# Patient Record
Sex: MALE | Race: WHITE | NOT HISPANIC OR LATINO | Employment: FULL TIME | URBAN - METROPOLITAN AREA
[De-identification: names, ages, dates, MRNs, and addresses within clinical notes are randomized per-mention and may not be internally consistent; named-entity substitution may affect disease eponyms.]

---

## 2022-09-08 ENCOUNTER — OFFICE VISIT (OUTPATIENT)
Dept: FAMILY MEDICINE CLINIC | Facility: CLINIC | Age: 42
End: 2022-09-08
Payer: COMMERCIAL

## 2022-09-08 VITALS
DIASTOLIC BLOOD PRESSURE: 90 MMHG | HEIGHT: 67 IN | BODY MASS INDEX: 24.96 KG/M2 | RESPIRATION RATE: 16 BRPM | WEIGHT: 159 LBS | TEMPERATURE: 97.9 F | OXYGEN SATURATION: 99 % | HEART RATE: 86 BPM | SYSTOLIC BLOOD PRESSURE: 128 MMHG

## 2022-09-08 DIAGNOSIS — R53.83 FATIGUE, UNSPECIFIED TYPE: ICD-10-CM

## 2022-09-08 DIAGNOSIS — Z86.16 PERSONAL HISTORY OF COVID-19: ICD-10-CM

## 2022-09-08 DIAGNOSIS — Z00.00 ENCOUNTER FOR ANNUAL PHYSICAL EXAM: Primary | ICD-10-CM

## 2022-09-08 DIAGNOSIS — Z23 ENCOUNTER FOR IMMUNIZATION: ICD-10-CM

## 2022-09-08 DIAGNOSIS — Z13.220 SCREENING FOR LIPID DISORDERS: ICD-10-CM

## 2022-09-08 PROCEDURE — 90715 TDAP VACCINE 7 YRS/> IM: CPT

## 2022-09-08 PROCEDURE — 90471 IMMUNIZATION ADMIN: CPT

## 2022-09-08 PROCEDURE — 3725F SCREEN DEPRESSION PERFORMED: CPT | Performed by: FAMILY MEDICINE

## 2022-09-08 PROCEDURE — 99386 PREV VISIT NEW AGE 40-64: CPT | Performed by: FAMILY MEDICINE

## 2022-09-08 NOTE — PATIENT INSTRUCTIONS
Wellness Visit for Adults   AMBULATORY CARE:   A wellness visit  is when you see your healthcare provider to get screened for health problems  Your healthcare provider will also give you advice on how to stay healthy  Write down your questions so you remember to ask them  Ask your healthcare provider how often you should have a wellness visit  What happens at a wellness visit:  Your healthcare provider will ask about your health, and your family history of health problems  This includes high blood pressure, heart disease, and cancer  He or she will ask if you have symptoms that concern you, if you smoke, and about your mood  You may also be asked about your intake of medicines, supplements, food, and alcohol  Any of the following may be done: Your weight  will be checked  Your height may also be checked so your body mass index (BMI) can be calculated  Your BMI shows if you are at a healthy weight  Your blood pressure  and heart rate will be checked  Your temperature may also be checked  Blood and urine tests  may be done  Blood tests may be done to check your cholesterol levels  Abnormal cholesterol levels increase your risk for heart disease and stroke  You may also need a blood or urine test to check for diabetes if you are at increased risk  Urine tests may be done to look for signs of an infection or kidney disease  A physical exam  includes checking your heartbeat and lungs with a stethoscope  Your healthcare provider may also check your skin to look for sun damage  Screening tests  may be recommended  A screening test is done to check for diseases that may not cause symptoms  The screening tests you may need depend on your age, gender, family history, and lifestyle habits  For example, colorectal screening may be recommended if you are 48years old or older  Screening tests you need if you are a woman:   A Pap smear  is used to screen for cervical cancer   Pap smears are usually done every 3 to 5 years depending on your age  You may need them more often if you have had abnormal Pap smear test results in the past  Ask your healthcare provider how often you should have a Pap smear  A mammogram  is an x-ray of your breasts to screen for breast cancer  Experts recommend mammograms every 2 years starting at age 48 years  You may need a mammogram at age 52 years or younger if you have an increased risk for breast cancer  Talk to your healthcare provider about when you should start having mammograms and how often you need them  Vaccines you may need:   Get an influenza vaccine  every year  The influenza vaccine protects you from the flu  Several types of viruses cause the flu  The viruses change over time, so new vaccines are made each year  Get a tetanus-diphtheria (Td) booster vaccine  every 10 years  This vaccine protects you against tetanus and diphtheria  Tetanus is a severe infection that may cause painful muscle spasms and lockjaw  Diphtheria is a severe bacterial infection that causes a thick covering in the back of your mouth and throat  Get a human papillomavirus (HPV) vaccine  if you are female and aged 23 to 32 or male 23 to 24 and never received it  This vaccine protects you from HPV infection  HPV is the most common infection spread by sexual contact  HPV may also cause vaginal, penile, and anal cancers  Get a pneumococcal vaccine  if you are aged 72 years or older  The pneumococcal vaccine is an injection given to protect you from pneumococcal disease  Pneumococcal disease is an infection caused by pneumococcal bacteria  The infection may cause pneumonia, meningitis, or an ear infection  Get a shingles vaccine  if you are 60 or older, even if you have had shingles before  The shingles vaccine is an injection to protect you from the varicella-zoster virus  This is the same virus that causes chickenpox   Shingles is a painful rash that develops in people who had chickenpox or have been exposed to the virus  How to eat healthy:  My Plate is a model for planning healthy meals  It shows the types and amounts of foods that should go on your plate  Fruits and vegetables make up about half of your plate, and grains and protein make up the other half  A serving of dairy is included on the side of your plate  The amount of calories and serving sizes you need depends on your age, gender, weight, and height  Examples of healthy foods are listed below:  Eat a variety of vegetables  such as dark green, red, and orange vegetables  You can also include canned vegetables low in sodium (salt) and frozen vegetables without added butter or sauces  Eat a variety of fresh fruits , canned fruit in 100% juice, frozen fruit, and dried fruit  Include whole grains  At least half of the grains you eat should be whole grains  Examples include whole-wheat bread, wheat pasta, brown rice, and whole-grain cereals such as oatmeal     Eat a variety of protein foods such as seafood (fish and shellfish), lean meat, and poultry without skin (turkey and chicken)  Examples of lean meats include pork leg, shoulder, or tenderloin, and beef round, sirloin, tenderloin, and extra lean ground beef  Other protein foods include eggs and egg substitutes, beans, peas, soy products, nuts, and seeds  Choose low-fat dairy products such as skim or 1% milk or low-fat yogurt, cheese, and cottage cheese  Limit unhealthy fats  such as butter, hard margarine, and shortening  Exercise:  Exercise at least 30 minutes per day on most days of the week  Some examples of exercise include walking, biking, dancing, and swimming  You can also fit in more physical activity by taking the stairs instead of the elevator or parking farther away from stores  Include muscle strengthening activities 2 days each week  Regular exercise provides many health benefits   It helps you manage your weight, and decreases your risk for type 2 diabetes, heart disease, stroke, and high blood pressure  Exercise can also help improve your mood  Ask your healthcare provider about the best exercise plan for you  General health and safety guidelines:   Do not smoke  Nicotine and other chemicals in cigarettes and cigars can cause lung damage  Ask your healthcare provider for information if you currently smoke and need help to quit  E-cigarettes or smokeless tobacco still contain nicotine  Talk to your healthcare provider before you use these products  Limit alcohol  A drink of alcohol is 12 ounces of beer, 5 ounces of wine, or 1½ ounces of liquor  Lose weight, if needed  Being overweight increases your risk of certain health conditions  These include heart disease, high blood pressure, type 2 diabetes, and certain types of cancer  Protect your skin  Do not sunbathe or use tanning beds  Use sunscreen with a SPF 15 or higher  Apply sunscreen at least 15 minutes before you go outside  Reapply sunscreen every 2 hours  Wear protective clothing, hats, and sunglasses when you are outside  Drive safely  Always wear your seatbelt  Make sure everyone in your car wears a seatbelt  A seatbelt can save your life if you are in an accident  Do not use your cell phone when you are driving  This could distract you and cause an accident  Pull over if you need to make a call or send a text message  Practice safe sex  Use latex condoms if are sexually active and have more than one partner  Your healthcare provider may recommend screening tests for sexually transmitted infections (STIs)  Wear helmets, lifejackets, and protective gear  Always wear a helmet when you ride a bike or motorcycle, go skiing, or play sports that could cause a head injury  Wear protective equipment when you play sports  Wear a lifejacket when you are on a boat or doing water sports      © Copyright [employer] 2022 Information is for End User's use only and may not be sold, redistributed or otherwise used for commercial purposes  All illustrations and images included in CareNotes® are the copyrighted property of A D [employer] M [employer] Inc  or [employer]  The above information is an  only  It is not intended as medical advice for individual conditions or treatments  Talk to your doctor, nurse or pharmacist before following any medical regimen to see if it is safe and effective for you

## 2022-09-08 NOTE — PROGRESS NOTES
[address]    NAME: Didi Caruso  AGE: 38 y o  SEX: male  : 1980     DATE: 2022     Assessment and Plan:     Here as a new patient with fatigue  Had Mayte in May  Suspect post covid fatigue  May also have CHRISTAL with a stop bang of 3  Will order labs  Tdap administered today  Will need colon cancer screen next month  Recommend increased activity  Follow up in 6 months or sooner as needed      Problem List Items Addressed This Visit    None     Visit Diagnoses     Encounter for annual physical exam    -  Primary    Encounter for immunization        Relevant Orders    TDAP VACCINE GREATER THAN OR EQUAL TO 8YO IM (Completed)    Personal history of COVID-19        Fatigue, unspecified type        Relevant Orders    CBC and differential    TSH, 3rd generation with Free T4 reflex    Vitamin D 25 hydroxy    BMI 25 0-25 9,adult        Relevant Orders    Comprehensive metabolic panel    Screening for lipid disorders        Relevant Orders    Lipid panel          Immunizations and preventive care screenings were discussed with patient today  Appropriate education was printed on patient's after visit summary  Counseling:  · Exercise: the importance of regular exercise/physical activity was discussed  Recommend exercise 3-5 times per week for at least 30 minutes  BMI Counseling: Body mass index is 25 28 kg/m²  The BMI is above normal  Nutrition recommendations include encouraging healthy choices of fruits and vegetables, decreasing fast food intake, limiting drinks that contain sugar, moderation in carbohydrate intake, increasing intake of lean protein, reducing intake of saturated and trans fat and reducing intake of cholesterol  Exercise recommendations include moderate physical activity 150 minutes/week  No pharmacotherapy was ordered  Rationale for BMI follow-up plan is due to patient being overweight or obese       Depression Screening and Follow-up Plan: Patient was screened for depression during today's encounter  They screened negative with a PHQ-2 score of 1  No follow-ups on file  Chief Complaint:     Chief Complaint   Patient presents with    Annual Exam     Feels very fatigued after covid in May      History of Present Illness:     Adult Annual Physical   Patient here as a new patient for a comprehensive physical exam   Had Mayte in May and has felt tired since   Increase daytime somnolence  Having difficulty sleeping at night  Sleep is interrupted  Before COVID, he slept throughout the night  Decreased exercise endurance  Prior to Mayte he was active and would hike regularly   No melena   Has had witnessed apneic episodes  Snores at night  Has not been evaluated for sleep apnea  Had blood work done 6 years ago  Testerone > 500   Told he may have beta thalassemia minor  Also reports intermittent substernal pain that last 1-2 minutes and started 2-3 years ago  Attributes it to indigestion and is relieved with belching  3 on Stop bang    Diet and Physical Activity  · Diet/Nutrition: well balanced diet  · Exercise: Previously active  Used to hike and played basketball  Depression Screening  PHQ-2/9 Depression Screening    Little interest or pleasure in doing things: 1 - several days  Feeling down, depressed, or hopeless: 0 - not at all  PHQ-2 Score: 1  PHQ-2 Interpretation: Negative depression screen       General Health  · Sleep: sleeps poorly, unrefreshing sleep and witnessed apnea  · Hearing: normal - bilateral   · Vision: previous LASIK surgery 1 year ago   · Dental: regular dental visits and was last seen 2 months ago    Health  · Symptoms include: urinary frequency and difficulty with sustaining an erection  This comes and go  Review of Systems:     Review of Systems   Constitutional: Positive for fatigue  Negative for appetite change, chills and fever     Respiratory: Positive for shortness of breath (with exertion )  Negative for chest tightness and wheezing  Cardiovascular: Negative for chest pain, palpitations and leg swelling  Gastrointestinal: Negative for abdominal pain, diarrhea, nausea and vomiting  Musculoskeletal: Negative for myalgias  Neurological: Negative for dizziness, weakness, light-headedness and headaches  Psychiatric/Behavioral: Positive for sleep disturbance  Past Medical History:     Past Medical History:   Diagnosis Date    Chalazion of right upper eyelid     COVID-19 05/2022      Past Surgical History:     Past Surgical History:   Procedure Laterality Date    HERNIA REPAIR      LASIK        Family History:     Family History   Adopted: Yes   Family history unknown: Yes      Social History:     Social History     Socioeconomic History    Marital status: Single     Spouse name: None    Number of children: None    Years of education: None    Highest education level: None   Occupational History    None   Tobacco Use    Smoking status: Never Smoker    Smokeless tobacco: Never Used   Vaping Use    Vaping Use: Never used   Substance and Sexual Activity    Alcohol use: Yes    Drug use: Not Currently     Types: Marijuana    Sexual activity: Yes     Partners: Female   Other Topics Concern    None   Social History Narrative    None     Social Determinants of Health     Financial Resource Strain: Not on file   Food Insecurity: Not on file   Transportation Needs: Not on file   Physical Activity: Not on file   Stress: Not on file   Social Connections: Not on file   Intimate Partner Violence: Not on file   Housing Stability: Not on file      Current Medications:     No current outpatient medications on file  No current facility-administered medications for this visit        Allergies:     No Known Allergies   Physical Exam:     /90 (BP Location: Left arm, Patient Position: Sitting, Cuff Size: Standard)   Pulse 86   Temp 97 9 °F (36 6 °C) (Tympanic)   Resp 16   Ht 5' 6 5" (1 689 m)   Wt 72 1 kg (159 lb)   SpO2 99%   BMI 25 28 kg/m²     Physical Exam  Vitals reviewed  Constitutional:       General: He is not in acute distress  Appearance: Normal appearance  HENT:      Head: Normocephalic and atraumatic  Right Ear: Tympanic membrane normal       Left Ear: Tympanic membrane normal       Mouth/Throat:      Mouth: Mucous membranes are moist       Pharynx: No oropharyngeal exudate or posterior oropharyngeal erythema  Eyes:      Extraocular Movements: Extraocular movements intact  Cardiovascular:      Rate and Rhythm: Normal rate and regular rhythm  Heart sounds: No murmur heard  Pulmonary:      Effort: Pulmonary effort is normal  No respiratory distress  Breath sounds: No stridor  No wheezing, rhonchi or rales  Abdominal:      General: Abdomen is flat  There is no distension  Palpations: Abdomen is soft  There is no mass  Tenderness: There is no abdominal tenderness  There is no guarding or rebound  Hernia: No hernia is present  Musculoskeletal:      Right lower leg: No edema  Left lower leg: No edema  Skin:     General: Skin is warm  Neurological:      Mental Status: He is alert and oriented to person, place, and time  Psychiatric:         Mood and Affect: Mood normal          Behavior: Behavior normal          Thought Content:  Thought content normal          Judgment: Judgment normal           Reema Gunn MD  [address] [facility]

## 2022-09-10 ENCOUNTER — OFFICE VISIT (OUTPATIENT)
Dept: URGENT CARE | Age: 42
End: 2022-09-10
Payer: COMMERCIAL

## 2022-09-10 VITALS
HEIGHT: 66 IN | HEART RATE: 88 BPM | OXYGEN SATURATION: 99 % | RESPIRATION RATE: 18 BRPM | TEMPERATURE: 97.8 F | WEIGHT: 160 LBS | DIASTOLIC BLOOD PRESSURE: 86 MMHG | SYSTOLIC BLOOD PRESSURE: 144 MMHG | BODY MASS INDEX: 25.71 KG/M2

## 2022-09-10 DIAGNOSIS — R68.84 JAW PAIN: Primary | ICD-10-CM

## 2022-09-10 DIAGNOSIS — R07.81 RIB PAIN: ICD-10-CM

## 2022-09-10 PROCEDURE — 99213 OFFICE O/P EST LOW 20 MIN: CPT

## 2022-09-10 NOTE — PROGRESS NOTES
[address]        NAME: Bryant Trujillo is a 38 y o  male  : 1980    MRN: [id number]  DATE: 2022  TIME: 3:27 PM    Assessment and Plan   Jaw pain [R68 [id number]]  1  Jaw pain     2  Rib pain           Patient Instructions     Complains of jaw pain with chest pain/rib pain and back pain  Recommend ER evaluation  Declined ambulance  Chief Complaint     Chief Complaint   Patient presents with    Jaw Pain     Episodes of Jaw Pain, Rib Pain, Back Pain began today, Patient denies any pain currently         History of Present Illness     38 y o  M presents with complaints of bilateral jaw pain, rib pain and back pain starting today  Denies trauma or injury  Denies recent illness  Denies CP or SOB  No fevers or chills  Denies strenuous activity  Denies hx of PE/DVT  No significant PMH  Denies numbness or tingling  Review of Systems   Review of Systems   Constitutional: Negative for chills, fatigue and fever  HENT: Negative for congestion, ear discharge, ear pain, facial swelling, rhinorrhea, sinus pressure, sinus pain, sore throat and trouble swallowing  Jaw pain   Eyes: Negative for photophobia, pain and visual disturbance  Respiratory: Negative for cough, chest tightness, shortness of breath and wheezing  Cardiovascular: Positive for chest pain (rib pain)  Negative for palpitations and leg swelling  Gastrointestinal: Negative for abdominal pain, diarrhea, nausea and vomiting  Genitourinary: Negative for dysuria, flank pain and hematuria  Musculoskeletal: Positive for back pain  Negative for arthralgias, myalgias and neck pain  Skin: Negative for pallor and wound  Neurological: Negative for dizziness, syncope, weakness, numbness and headaches  Psychiatric/Behavioral: Negative for confusion and sleep disturbance  All other systems reviewed and are negative  Current Medications     No current outpatient medications on file      Current Allergies Allergies as of 09/10/2022    (No Known Allergies)            The following portions of the patient's history were reviewed and updated as appropriate: allergies, current medications, past family history, past medical history, past social history, past surgical history and problem list      Past Medical History:   Diagnosis Date    Chalazion of right upper eyelid     COVID-19 05/2022       Past Surgical History:   Procedure Laterality Date    HERNIA REPAIR      LASIK         Family History   Adopted: Yes   Family history unknown: Yes         Medications have been verified  Objective   /86   Pulse 88   Temp 97 8 °F (36 6 °C)   Resp 18   Ht 5' 6" (1 676 m)   Wt 72 6 kg (160 lb)   SpO2 99%   BMI 25 82 kg/m²   No LMP for male patient  Physical Exam     Physical Exam  Vitals reviewed  Constitutional:       General: He is not in acute distress  Appearance: He is normal weight  He is not ill-appearing or toxic-appearing  HENT:      Head: Normocephalic  Right Ear: Tympanic membrane normal  No middle ear effusion  Tympanic membrane is not erythematous or bulging  Left Ear: Tympanic membrane normal   No middle ear effusion  Tympanic membrane is not erythematous or bulging  Nose: Nose normal       Right Sinus: No maxillary sinus tenderness or frontal sinus tenderness  Left Sinus: No maxillary sinus tenderness or frontal sinus tenderness  Mouth/Throat:      Mouth: Mucous membranes are moist       Pharynx: Uvula midline  No oropharyngeal exudate, posterior oropharyngeal erythema or uvula swelling  Tonsils: No tonsillar exudate or tonsillar abscesses  Eyes:      Extraocular Movements: Extraocular movements intact  Conjunctiva/sclera: Conjunctivae normal       Pupils: Pupils are equal, round, and reactive to light  Cardiovascular:      Rate and Rhythm: Normal rate and regular rhythm  Pulses: Normal pulses  Heart sounds: Normal heart sounds  Pulmonary:      Effort: Pulmonary effort is normal  No tachypnea or respiratory distress  Breath sounds: Normal breath sounds and air entry  No decreased breath sounds, wheezing, rhonchi or rales  Abdominal:      General: Bowel sounds are normal       Palpations: Abdomen is soft  Tenderness: There is no abdominal tenderness  Musculoskeletal:         General: Normal range of motion  Cervical back: Normal range of motion and neck supple  Lymphadenopathy:      Cervical: No cervical adenopathy  Skin:     General: Skin is warm and dry  Capillary Refill: Capillary refill takes less than 2 seconds  Neurological:      General: No focal deficit present  Mental Status: He is alert  Cranial Nerves: Cranial nerves are intact  No cranial nerve deficit  Sensory: Sensation is intact  Motor: Motor function is intact  Coordination: Coordination is intact  Deep Tendon Reflexes: Reflexes are normal and symmetric

## 2022-09-12 ENCOUNTER — TELEPHONE (OUTPATIENT)
Dept: FAMILY MEDICINE CLINIC | Facility: CLINIC | Age: 42
End: 2022-09-12

## 2022-09-12 ENCOUNTER — HOSPITAL ENCOUNTER (EMERGENCY)
Facility: HOSPITAL | Age: 42
Discharge: HOME | End: 2022-09-12
Attending: EMERGENCY MEDICINE
Payer: COMMERCIAL

## 2022-09-12 VITALS
HEIGHT: 66 IN | TEMPERATURE: 98.3 F | BODY MASS INDEX: 25.86 KG/M2 | HEART RATE: 79 BPM | RESPIRATION RATE: 18 BRPM | OXYGEN SATURATION: 100 % | DIASTOLIC BLOOD PRESSURE: 72 MMHG | SYSTOLIC BLOOD PRESSURE: 136 MMHG | WEIGHT: 160.94 LBS

## 2022-09-12 DIAGNOSIS — J06.9 URI (UPPER RESPIRATORY INFECTION): ICD-10-CM

## 2022-09-12 DIAGNOSIS — R51.9 HEADACHE: Primary | ICD-10-CM

## 2022-09-12 LAB
ALBUMIN SERPL BCP-MCNC: 4.6 G/DL (ref 3.5–5)
ALP SERPL-CCNC: 52 U/L (ref 34–104)
ALT SERPL W P-5'-P-CCNC: 19 U/L (ref 7–52)
ANION GAP SERPL CALCULATED.3IONS-SCNC: 7 MMOL/L (ref 4–13)
AST SERPL W P-5'-P-CCNC: 20 U/L (ref 13–39)
BASOPHILS # BLD AUTO: 0.03 THOUSANDS/ΜL (ref 0–0.1)
BASOPHILS NFR BLD AUTO: 0 % (ref 0–1)
BILIRUB SERPL-MCNC: 1.14 MG/DL (ref 0.2–1)
BUN SERPL-MCNC: 20 MG/DL (ref 5–25)
CALCIUM SERPL-MCNC: 9.3 MG/DL (ref 8.4–10.2)
CARDIAC TROPONIN I PNL SERPL HS: 2 NG/L
CHLORIDE SERPL-SCNC: 104 MMOL/L (ref 96–108)
CO2 SERPL-SCNC: 27 MMOL/L (ref 21–32)
CREAT SERPL-MCNC: 1.28 MG/DL (ref 0.6–1.3)
EOSINOPHIL # BLD AUTO: 0.02 THOUSAND/ΜL (ref 0–0.61)
EOSINOPHIL NFR BLD AUTO: 0 % (ref 0–6)
ERYTHROCYTE [DISTWIDTH] IN BLOOD BY AUTOMATED COUNT: 18.1 % (ref 11.6–15.1)
FLUAV RNA RESP QL NAA+PROBE: NEGATIVE
FLUBV RNA RESP QL NAA+PROBE: NEGATIVE
GFR SERPL CREATININE-BSD FRML MDRD: 67 ML/MIN/1.73SQ M
GLUCOSE SERPL-MCNC: 90 MG/DL (ref 65–140)
HCT VFR BLD AUTO: 46.1 % (ref 36.5–49.3)
HGB BLD-MCNC: 14.3 G/DL (ref 12–17)
IMM GRANULOCYTES # BLD AUTO: 0.02 THOUSAND/UL (ref 0–0.2)
IMM GRANULOCYTES NFR BLD AUTO: 0 % (ref 0–2)
LIPASE SERPL-CCNC: 19 U/L (ref 11–82)
LYMPHOCYTES # BLD AUTO: 1.35 THOUSANDS/ΜL (ref 0.6–4.47)
LYMPHOCYTES NFR BLD AUTO: 16 % (ref 14–44)
MCH RBC QN AUTO: 19.3 PG (ref 26.8–34.3)
MCHC RBC AUTO-ENTMCNC: 31 G/DL (ref 31.4–37.4)
MCV RBC AUTO: 62 FL (ref 82–98)
MONOCYTES # BLD AUTO: 0.48 THOUSAND/ΜL (ref 0.17–1.22)
MONOCYTES NFR BLD AUTO: 6 % (ref 4–12)
NEUTROPHILS # BLD AUTO: 6.65 THOUSANDS/ΜL (ref 1.85–7.62)
NEUTS SEG NFR BLD AUTO: 78 % (ref 43–75)
NRBC BLD AUTO-RTO: 0 /100 WBCS
PLATELET # BLD AUTO: 203 THOUSANDS/UL (ref 149–390)
POTASSIUM SERPL-SCNC: 3.9 MMOL/L (ref 3.5–5.3)
PROT SERPL-MCNC: 7.2 G/DL (ref 6.4–8.4)
RBC # BLD AUTO: 7.42 MILLION/UL (ref 3.88–5.62)
RSV RNA RESP QL NAA+PROBE: NEGATIVE
SARS-COV-2 RNA RESP QL NAA+PROBE: NEGATIVE
SODIUM SERPL-SCNC: 138 MMOL/L (ref 135–147)
WBC # BLD AUTO: 8.55 THOUSAND/UL (ref 4.31–10.16)

## 2022-09-12 PROCEDURE — 36415 COLL VENOUS BLD VENIPUNCTURE: CPT | Performed by: EMERGENCY MEDICINE

## 2022-09-12 PROCEDURE — 85025 COMPLETE CBC W/AUTO DIFF WBC: CPT | Performed by: EMERGENCY MEDICINE

## 2022-09-12 PROCEDURE — 93005 ELECTROCARDIOGRAM TRACING: CPT

## 2022-09-12 PROCEDURE — 99284 EMERGENCY DEPT VISIT MOD MDM: CPT

## 2022-09-12 PROCEDURE — 83690 ASSAY OF LIPASE: CPT | Performed by: EMERGENCY MEDICINE

## 2022-09-12 PROCEDURE — 84484 ASSAY OF TROPONIN QUANT: CPT | Performed by: EMERGENCY MEDICINE

## 2022-09-12 PROCEDURE — 0241U HB NFCT DS VIR RESP RNA 4 TRGT: CPT | Performed by: EMERGENCY MEDICINE

## 2022-09-12 PROCEDURE — 96374 THER/PROPH/DIAG INJ IV PUSH: CPT

## 2022-09-12 PROCEDURE — 99285 EMERGENCY DEPT VISIT HI MDM: CPT | Performed by: EMERGENCY MEDICINE

## 2022-09-12 PROCEDURE — 80053 COMPREHEN METABOLIC PANEL: CPT | Performed by: EMERGENCY MEDICINE

## 2022-09-12 RX ORDER — ACETAMINOPHEN 325 MG/1
975 TABLET ORAL ONCE
Status: COMPLETED | OUTPATIENT
Start: 2022-09-12 | End: 2022-09-12

## 2022-09-12 RX ORDER — KETOROLAC TROMETHAMINE 30 MG/ML
15 INJECTION, SOLUTION INTRAMUSCULAR; INTRAVENOUS ONCE
Status: COMPLETED | OUTPATIENT
Start: 2022-09-12 | End: 2022-09-12

## 2022-09-12 RX ORDER — NAPROXEN 500 MG/1
500 TABLET ORAL 2 TIMES DAILY WITH MEALS
Qty: 10 TABLET | Refills: 0 | Status: SHIPPED | OUTPATIENT
Start: 2022-09-12 | End: 2022-09-17

## 2022-09-12 RX ADMIN — ACETAMINOPHEN 975 MG: 325 TABLET ORAL at 14:34

## 2022-09-12 RX ADMIN — KETOROLAC TROMETHAMINE 15 MG: 30 INJECTION, SOLUTION INTRAMUSCULAR at 14:34

## 2022-09-12 NOTE — Clinical Note
Mary Garcia was seen and treated in our emergency department on 9/12/2022  Diagnosis:     Ana Luisa Lynn  may return to work on return date  He may return on this date: 09/14/2022         If you have any questions or concerns, please don't hesitate to call        Joan Hoffman MD    ______________________________           _______________          _______________  [facility] Representative                              Date                                Time

## 2022-09-12 NOTE — ED PROVIDER NOTES
History  Chief Complaint   Patient presents with    Headache    Jaw Pain     Pt states he has had severe b/l head pain/pressure, radiating to jaw/neck, and b/l lung pain  Denies SOB  Advil 11am  Denies n/v/d  HPI     Patient is otherwise healthy 43-year-old male who presents emergency department for evaluation of gradual onset bilateral head pain  Had some lung pain over the weekend  Denies any shortness of breath  No nausea, vomiting, diarrhea  States he started off with head cold  His symptoms have been waxing and waning over the past several days  No tick exposure  He went to urgent care several days ago, was asked to go the emergency department if he feels bad  He called his doctor today and was told to go to the emergency department  He denies taking any medications  Denies any falls or trauma  Denies any thunderclap future headache  Denies any neck pain  Is unsure if he had a fever home because he does not have a thermometer  None       Past Medical History:   Diagnosis Date    Chalazion of right upper eyelid     COVID-19 05/2022       Past Surgical History:   Procedure Laterality Date    HERNIA REPAIR      LASIK         Family History   Adopted: Yes   Family history unknown: Yes     I have reviewed and agree with the history as documented  E-Cigarette/Vaping    E-Cigarette Use Never User      E-Cigarette/Vaping Substances    Nicotine No     THC No     CBD No     Flavoring No     Other No     Unknown No      Social History     Tobacco Use    Smoking status: Never Smoker    Smokeless tobacco: Never Used   Vaping Use    Vaping Use: Never used   Substance Use Topics    Alcohol use: Yes     Alcohol/week: 2 0 standard drinks     Types: 2 Glasses of wine per week    Drug use: Not Currently     Types: Marijuana       Review of Systems   HENT: Positive for congestion  Neurological: Positive for headaches  All other systems reviewed and are negative        Physical Exam  Physical Exam  Vitals and nursing note reviewed  Constitutional:       General: He is not in acute distress  Appearance: He is well-developed  He is not diaphoretic  HENT:      Head: Normocephalic and atraumatic  Right Ear: External ear normal       Left Ear: External ear normal    Eyes:      General:         Right eye: No discharge  Left eye: No discharge  Pupils: Pupils are equal, round, and reactive to light  Neck:      Thyroid: No thyromegaly  Trachea: No tracheal deviation  Cardiovascular:      Rate and Rhythm: Normal rate and regular rhythm  Heart sounds: No murmur heard  Pulmonary:      Effort: Pulmonary effort is normal       Breath sounds: Normal breath sounds  Abdominal:      General: Bowel sounds are normal  There is no distension  Palpations: Abdomen is soft  Tenderness: There is no abdominal tenderness  Musculoskeletal:         General: No deformity  Normal range of motion  Cervical back: Normal range of motion and neck supple  Skin:     General: Skin is warm  Capillary Refill: Capillary refill takes less than 2 seconds  Neurological:      Mental Status: He is alert and oriented to person, place, and time  Cranial Nerves: No cranial nerve deficit  Motor: No abnormal muscle tone     Psychiatric:         Behavior: Behavior normal          Vital Signs  ED Triage Vitals [09/12/22 1334]   Temperature Pulse Respirations Blood Pressure SpO2   98 3 °F (36 8 °C) 89 18 (!) 178/99 100 %      Temp Source Heart Rate Source Patient Position - Orthostatic VS BP Location FiO2 (%)   Oral Monitor Sitting Right arm --      Pain Score       9           Vitals:    09/12/22 1334 09/12/22 1345 09/12/22 1500   BP: (!) 178/99 146/83 136/72   Pulse: 89 82 79   Patient Position - Orthostatic VS: Sitting Lying          Visual Acuity      ED Medications  Medications   acetaminophen (TYLENOL) tablet 975 mg (975 mg Oral Given 9/12/22 1434)   ketorolac (TORADOL) injection 15 mg (15 mg Intravenous Given 9/12/22 1434)       Diagnostic Studies  Results Reviewed     Procedure Component Value Units Date/Time    HS Troponin 0hr (reflex protocol) [[id number]]  (Normal) Collected: 09/12/22 1426    Lab Status: Final result Specimen: Blood from Arm, Right Updated: 09/12/22 1503     hs TnI 0hr 2 ng/L     Comprehensive metabolic panel [[id number]]  (Abnormal) Collected: 09/12/22 1426    Lab Status: Final result Specimen: Blood from Arm, Right Updated: 09/12/22 1453     Sodium 138 mmol/L      Potassium 3 9 mmol/L      Chloride 104 mmol/L      CO2 27 mmol/L      ANION GAP 7 mmol/L      BUN 20 mg/dL      Creatinine 1 28 mg/dL      Glucose 90 mg/dL      Calcium 9 3 mg/dL      AST 20 U/L      ALT 19 U/L      Alkaline Phosphatase 52 U/L      Total Protein 7 2 g/dL      Albumin 4 6 g/dL      Total Bilirubin 1 14 mg/dL      eGFR 67 ml/min/1 73sq m     Narrative:      [place] guidelines for Chronic Kidney Disease (CKD):     Stage 1 with normal or high GFR (GFR > 90 mL/min/1 73 square meters)    Stage 2 Mild CKD (GFR = 60-89 mL/min/1 73 square meters)    Stage 3A Moderate CKD (GFR = 45-59 mL/min/1 73 square meters)    Stage 3B Moderate CKD (GFR = 30-44 mL/min/1 73 square meters)    Stage 4 Severe CKD (GFR = 15-29 mL/min/1 73 square meters)    Stage 5 End Stage CKD (GFR <15 mL/min/1 73 square meters)  Note: GFR calculation is accurate only with a steady state creatinine    Lipase [[id number]]  (Normal) Collected: 09/12/22 1426    Lab Status: Final result Specimen: Blood from Arm, Right Updated: 09/12/22 1453     Lipase 19 u/L     CBC and differential [[id number]]  (Abnormal) Collected: 09/12/22 1426    Lab Status: Final result Specimen: Blood from Arm, Right Updated: 09/12/22 1443     WBC 8 55 Thousand/uL      RBC 7 42 Million/uL      Hemoglobin 14 3 g/dL      Hematocrit 46 1 %      MCV 62 fL      MCH 19 3 pg      MCHC 31 0 g/dL      RDW 18 1 % Platelets 751 Thousands/uL      nRBC 0 /100 WBCs      Neutrophils Relative 78 %      Immat GRANS % 0 %      Lymphocytes Relative 16 %      Monocytes Relative 6 %      Eosinophils Relative 0 %      Basophils Relative 0 %      Neutrophils Absolute 6 65 Thousands/µL      Immature Grans Absolute 0 02 Thousand/uL      Lymphocytes Absolute 1 35 Thousands/µL      Monocytes Absolute 0 48 Thousand/µL      Eosinophils Absolute 0 02 Thousand/µL      Basophils Absolute 0 03 Thousands/µL     FLU/RSV/COVID - if FLU/RSV clinically relevant [[id number]] Collected: 09/12/22 1426    Lab Status: In process Specimen: Nares from Nose Updated: 09/12/22 1433                 No orders to display              Procedures  ECG 12 Lead Documentation Only    Date/Time: 9/12/2022 2:06 PM  Performed by: Areli Rodriguez MD  Authorized by: Areli Rodriguez MD     Indications / Diagnosis:  Resolved burning lung pain  ECG reviewed by me, the ED Provider: yes    Patient location:  ED  Interpretation:     Interpretation: normal    Rate:     ECG rate:  81    ECG rate assessment: normal    Rhythm:     Rhythm: sinus rhythm    Ectopy:     Ectopy: none    QRS:     QRS axis:  Normal    QRS intervals:  Normal  Conduction:     Conduction: normal    ST segments:     ST segments:  Normal  T waves:     T waves: normal               ED Course             HEART Risk Score    Flowsheet Row Most Recent Value   Heart Score Risk Calculator    History 0 Filed at: 09/12/2022 1519   ECG 0 Filed at: 09/12/2022 1519   Age 0 Filed at: 09/12/2022 1519   Risk Factors 0 Filed at: 09/12/2022 1519   Troponin 0 Filed at: 09/12/2022 1519   HEART Score 0 Filed at: 09/12/2022 1519                        SBIRT 22yo+    Flowsheet Row Most Recent Value   SBIRT (25 yo +)    In order to provide better care to our patients, we are screening all of our patients for alcohol and drug use  Would it be okay to ask you these screening questions?  Unable to answer at this time Filed at: 09/12/2022 1353                    MDM  Number of Diagnoses or Management Options  Headache: new and requires workup  URI (upper respiratory infection): new and requires workup  Diagnosis management comments: Patient started with cold-like symptoms, now has bilateral headache  No red flag symptoms  Nonfocal examination  No meningismus  EKG unremarkable  Will check basic laboratory studies, COVID/influenza test, troponin  EKG, labs unremarkable  Patient reports some improvement of his headache  Additional physical examination unremarkable  No red flag symptoms, COVID/influenza test ordered, pending  Suspect URI resulting in headache  Nonfocal examination, no evidence of elevated intracranial pressure on history of physical exam   No falls or trauma  No advanced imaging or further diagnostics indicated emergency department  Symptomatic management at home, PCP follow-up recommended  Amount and/or Complexity of Data Reviewed  Clinical lab tests: ordered and reviewed  Tests in the medicine section of CPT®: ordered and reviewed    Risk of Complications, Morbidity, and/or Mortality  Presenting problems: high  Diagnostic procedures: moderate  Management options: high    Patient Progress  Patient progress: improved      Disposition  Final diagnoses:   Headache   URI (upper respiratory infection)     Time reflects when diagnosis was documented in both MDM as applicable and the Disposition within this note     Time User Action Codes Description Comment    9/12/2022  3:14 PM Silas Castanon [R51 [id number]] Headache     9/12/2022  3:14 PM Jesus Jimenez [J06 [id number]] URI (upper respiratory infection)       ED Disposition     ED Disposition   Discharge    Condition   Stable    Date/Time   Mon Sep 12, 2022  3:14 PM    Comment   Javad Correa discharge to home/self care                 Follow-up Information     Follow up With Specialties Details Why Contact Info Additional Yaneli Carter MD Family Medicine Schedule an appointment as soon as possible for a visit in 2 days As needed [phone]  [address] Emergency Department Emergency Medicine Go to  If symptoms worsen [address] Emergency Department, [address], [place], [place], [address]          Patient's Medications   Discharge Prescriptions    NAPROXEN (NAPROSYN) 500 MG TABLET    Take 1 tablet (500 mg total) by mouth 2 (two) times a day with meals for 5 days       Start Date: 9/12/2022 End Date: 9/17/2022       Order Dose: 500 mg       Quantity: 10 tablet    Refills: 0    SODIUM CHLORIDE (OCEAN) 0 65 % NASAL SPRAY    1 spray into each nostril as needed for congestion for up to 7 days       Start Date: 9/12/2022 End Date: 9/19/2022       Order Dose: 1 spray       Quantity: 50 mL    Refills: 0       No discharge procedures on file      PDMP Review     None          ED Provider  Electronically Signed by           Flory Evans MD  09/12/22 4724

## 2022-09-12 NOTE — TELEPHONE ENCOUNTER
FYI:   Pt called with fogginess, dizziness, cold extremities and a "head cold" since last week  Pt reported no change in vision or speech  No openings available   Advised pt to go to the urgent care/ER for eval

## 2022-09-14 ENCOUNTER — VBI (OUTPATIENT)
Dept: FAMILY MEDICINE CLINIC | Facility: CLINIC | Age: 42
End: 2022-09-14

## 2022-09-14 NOTE — LETTER
[address] [facility]  [address] [place] [address]    Date: 09/14/22  [address]  [place]  [facility] [address]    Dear Troy Newton: Thank you for choosing [facility] emergency department for care  As your primary care provider we want to make sure that your ongoing medical care is being addressed  If you require follow up care as a result of your emergency department visit, there are a few things we would like you to know  As part of our continuing commitment to caring for our patient, we have added more same day appointments and have extended our office hours to meet your medical needs  After hours, on-call physicians are available via our main office line  We encourage you to contact our office prior to seeking treatment to discuss your symptoms with our medical staff  Together, we can determine the correct course of action  A majority of non-emergent conditions such as: common cold, flu-like symptoms, fevers, strains/sprains, dislocations, minor burns, cuts and animal bites can be treated at [address] [place] [address] facilities  Diagnostic testing is available at some sites  Of course, if you are experiencing a life threatening medical emergency call 911 or proceed directly to the nearest emergency room      Your nearest [address] [place] [address] facility is conveniently located at:    [address] [place] [address], [facility] [place] [address]  [phone]  SKIP THE WAIT  Conveniently offered at most [facility] locations  [facility] your spot online at www [email or web address] or on the [facility] [address]    Sincerely,    [address] [facility]  Dept: [phone]

## 2022-09-14 NOTE — TELEPHONE ENCOUNTER
Frances Ram    ED Visit Information     Ed visit date: 9/12/22  Diagnosis Description: Headache; URI (upper respiratory infection)  In Network? Yes [facility]  Discharge status: Home  Discharged with meds ? Yes  Number of ED visits to date: 1  ED Severity:3     Outreach Information    Outreach successful: No 1  Date letter mailed:my chart letter   Date Finalized 9/14/22    Care Coordination    Follow up appointment with pcp: no 0  Transportation issues ? NA    Value Base Outreach    09/14/2022 11:03 AM Phone (VBI) Jyotsna Belcher (Self) [phone] [facility]) Remove     Not Available - Outreach performed ED f/u on behalf of the PCP's office  Wm Mik FAROOQ-  hung up -- my chart letter will be sent           By Emily Mon

## 2022-09-15 LAB
ATRIAL RATE: 81 BPM
P AXIS: 70 DEGREES
PR INTERVAL: 160 MS
QRS AXIS: 82 DEGREES
QRSD INTERVAL: 82 MS
QT INTERVAL: 368 MS
QTC INTERVAL: 427 MS
T WAVE AXIS: 41 DEGREES
VENTRICULAR RATE: 81 BPM

## 2022-09-15 PROCEDURE — 93010 ELECTROCARDIOGRAM REPORT: CPT | Performed by: INTERNAL MEDICINE

## 2022-10-27 ENCOUNTER — RA CDI HCC (OUTPATIENT)
Dept: OTHER | Facility: HOSPITAL | Age: 42
End: 2022-10-27

## 2022-10-31 ENCOUNTER — OFFICE VISIT (OUTPATIENT)
Dept: FAMILY MEDICINE CLINIC | Facility: CLINIC | Age: 42
End: 2022-10-31

## 2022-10-31 VITALS
OXYGEN SATURATION: 99 % | DIASTOLIC BLOOD PRESSURE: 80 MMHG | HEART RATE: 84 BPM | BODY MASS INDEX: 25.58 KG/M2 | HEIGHT: 66 IN | WEIGHT: 159.2 LBS | RESPIRATION RATE: 14 BRPM | SYSTOLIC BLOOD PRESSURE: 120 MMHG | TEMPERATURE: 97.9 F

## 2022-10-31 DIAGNOSIS — G43.B1 INTRACTABLE OPHTHALMOPLEGIC MIGRAINE: Primary | ICD-10-CM

## 2022-10-31 PROBLEM — M20.011 MALLET DEFORMITY OF RIGHT RING FINGER: Status: ACTIVE | Noted: 2018-11-21

## 2022-10-31 PROBLEM — M79.644 FINGER PAIN, RIGHT: Status: ACTIVE | Noted: 2018-11-21

## 2022-10-31 RX ORDER — RIZATRIPTAN BENZOATE 10 MG/1
10 TABLET ORAL AS NEEDED
Qty: 18 TABLET | Refills: 0 | Status: SHIPPED | OUTPATIENT
Start: 2022-10-31

## 2022-10-31 RX ORDER — KETOROLAC TROMETHAMINE 30 MG/ML
30 INJECTION, SOLUTION INTRAMUSCULAR; INTRAVENOUS ONCE
Status: DISCONTINUED | OUTPATIENT
Start: 2022-10-31 | End: 2022-10-31

## 2022-10-31 NOTE — ASSESSMENT & PLAN NOTE
Unilateral headache over the right eye for 3 days   No injuries   Neuro exam unrevealing  History of migraines as a child   Sx suggest classic migraine  Recommend Mg 400 mg and riboflavin 400 mg daily as PPX   Lie in darkened room and apply cold packs as needed for pain  Start Maxalt 10 mg prn for abortive therapy  Side effect profile discussed in detail  Patient reassured that neurodiagnostic workup not indicated from benign H&P    If pain persist, may consider decadrone  Work excuse also provided

## 2022-10-31 NOTE — PROGRESS NOTES
Brad Cantor is a 38 y o  male who presents for evaluation of headache  Symptoms began about 3 days ago  Generally, the headaches last about few hours  and occur continuously  The headaches are usually throbbing and are located over the right eye and temporal region  Work attendance or other daily activities have been affected by the headaches  Precipitating factors include: light and stress  The headaches are not preceded by an aura  Associated neurologic symptoms: decreased physical activity  The patient denies dizziness, loss of balance, muscle weakness, numbness of extremities, speech difficulties, vision problems and vomiting in the early morning  Home treatment has included ibuprofen, darkening the room and resting with some improvement  Other history includes: migraine headaches diagnosed in the past      The following portions of the patient's history were reviewed and updated as appropriate:   He  has a past medical history of Chalazion of right upper eyelid and COVID-19 (05/2022)  He   Patient Active Problem List    Diagnosis Date Noted   • Intractable ophthalmoplegic migraine 10/31/2022   • 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   • Finger pain, right 11/21/2018   • Erectile dysfunction 05/24/2016   • Reduced libido 04/26/2016   • Increased frequency of urination 04/26/2016     He  has a past surgical history that includes Hernia repair and LASIK  His He was adopted  Family history is unknown by patient  He  reports that he has never smoked  He has never used smokeless tobacco  He reports current alcohol use of about 2 0 standard drinks of alcohol per week  He reports previous drug use  Drug: Marijuana    Current Outpatient Medications on File Prior to Visit   Medication Sig   • naproxen (Naprosyn) 500 mg tablet Take 1 tablet (500 mg total) by mouth 2 (two) times a day with meals for 5 days (Patient not taking: Reported on 10/31/2022)   • [DISCONTINUED] sodium chloride (OCEAN) 0 65 % nasal spray 1 spray into each nostril as needed for congestion for up to 7 days (Patient not taking: Reported on 10/31/2022)     No current facility-administered medications on file prior to visit  He has No Known Allergies    Review of Systems  Pertinent items are noted in HPI       Objective     Physical Exam  Vitals reviewed  Constitutional:       General: He is in acute distress (d/t pain )  Appearance: Normal appearance  He is not ill-appearing  HENT:      Head: Normocephalic and atraumatic  Eyes:      General: No visual field deficit  Right eye: No discharge  Left eye: No discharge  Extraocular Movements: Extraocular movements intact  Pupils: Pupils are equal, round, and reactive to light  Cardiovascular:      Rate and Rhythm: Normal rate  Pulses: Normal pulses  Heart sounds: No murmur heard  Pulmonary:      Effort: Pulmonary effort is normal    Lymphadenopathy:      Cervical: No cervical adenopathy  Skin:     General: Skin is warm  Findings: No rash  Neurological:      Mental Status: He is alert  Cranial Nerves: No cranial nerve deficit, dysarthria or facial asymmetry  Sensory: Sensation is intact  Motor: No weakness or pronator drift  Coordination: Romberg sign negative  Finger-Nose-Finger Test normal  Rapid alternating movements normal       Gait: Gait is intact  Psychiatric:         Mood and Affect: Mood normal          Behavior: Behavior normal          Thought Content: Thought content normal          Judgment: Judgment normal            Assessment/Plan     Problem List Items Addressed This Visit        Cardiovascular and Mediastinum    Intractable ophthalmoplegic migraine - Primary     Unilateral headache over the right eye for 3 days   No injuries   Neuro exam unrevealing  History of migraines as a child   Sx suggest classic migraine    Recommend Mg 400 mg and riboflavin 400 mg daily as PPX   Lie in darkened room and apply cold packs as needed for pain  Start Maxalt 10 mg prn for abortive therapy  Side effect profile discussed in detail  Patient reassured that neurodiagnostic workup not indicated from benign H&P  If pain persist, may consider decadrone  Work excuse also provided           Relevant Medications    rizatriptan (Maxalt) 10 mg tablet    Riboflavin 400 MG CAPS            Classic migraine  Lie in darkened room and apply cold packs as needed for pain  Episodic therapy: 5 HT due to low frequency of pain  Prophylactic therapy: mg and ribflavin   due to high frequency of pain  Side effect profile discussed in detail  Asked to keep headache diary    Patient reassured that neurodiagnostic workup not indicated from benign H&P

## 2023-01-26 ENCOUNTER — OFFICE VISIT (OUTPATIENT)
Dept: URGENT CARE | Age: 43
End: 2023-01-26

## 2023-01-26 VITALS
HEART RATE: 86 BPM | RESPIRATION RATE: 18 BRPM | OXYGEN SATURATION: 99 % | SYSTOLIC BLOOD PRESSURE: 122 MMHG | DIASTOLIC BLOOD PRESSURE: 74 MMHG | TEMPERATURE: 97.1 F

## 2023-01-26 DIAGNOSIS — B34.9 VIRAL ILLNESS: ICD-10-CM

## 2023-01-26 DIAGNOSIS — R05.1 ACUTE COUGH: ICD-10-CM

## 2023-01-26 DIAGNOSIS — J02.9 SORE THROAT: Primary | ICD-10-CM

## 2023-01-26 LAB — S PYO AG THROAT QL: NEGATIVE

## 2023-01-26 RX ORDER — BENZONATATE 200 MG/1
200 CAPSULE ORAL 3 TIMES DAILY PRN
Qty: 20 CAPSULE | Refills: 0 | Status: SHIPPED | OUTPATIENT
Start: 2023-01-26

## 2023-01-26 NOTE — PATIENT INSTRUCTIONS
Take zyrtec or allegra daily  Use flonase 1-2 sprays in each nare daily   Use nasal saline to the nose,   Use humidifer in room  Symptoms worsen go to ER  Rest    Take cough meds as directed, tessalon perles given  No bacterial infection present at this time

## 2023-01-26 NOTE — PROGRESS NOTES
NAME: Carmelo Marie is a 38 y o  male  : 1980    MRN: [id number]    /74   Pulse 86   Temp (!) 97 1 °F (36 2 °C)   Resp 18   SpO2 99%     12:13 PM    Assessment and Plan   Sore throat [J02 9]  1  Sore throat  POCT rapid strepA    Cov/Flu-Collected at [facility] or [facility]    benzonatate (TESSALON) 200 MG capsule    Throat culture      2  Viral illness  benzonatate (TESSALON) 200 MG capsule    Throat culture      3  Acute cough  Throat culture          Luis Kimbrough was seen today for sore throat  Diagnoses and all orders for this visit:    Sore throat  -     POCT rapid strepA  -     Cov/Flu-Collected at [place] [address] or [facility]  -     benzonatate (TESSALON) 200 MG capsule; Take 1 capsule (200 mg total) by mouth 3 (three) times a day as needed for cough  -     Throat culture    Viral illness  -     benzonatate (TESSALON) 200 MG capsule; Take 1 capsule (200 mg total) by mouth 3 (three) times a day as needed for cough  -     Throat culture    Acute cough  -     Throat culture        Patient Instructions   Patient Instructions   Take zyrtec or allegra daily  Use flonase 1-2 sprays in each nare daily   Use nasal saline to the nose,   Use humidifer in room  Symptoms worsen go to ER  Rest    Take cough meds as directed, tessalon perles given  No bacterial infection present at this time  Proceed to the nearest ER if symptoms worsen, Follow up with your PCP  Continue to social distance, wash your hands, and wear your masks  Please continue to follow the CDC  gov guidelines daily for they are subject to change on COVID-19    Chief Complaint     Chief Complaint   Patient presents with   • Sore Throat     Patient relates severe sore throat since   Now has congestion  Home covid test negative  History of Present Illness     Patient is a 61-year-old male who is here today with a severe sore throat since   He now has nasal congestion took a home COVID test which was negative    Symptoms started 4 days ago  Patient has taken over-the-counter meds such as DayQuil NyQuil and Cepacol  Patient has taken Motrin and Tylenol as well for fever  Patient is supposed to go on vacation out of country to [address] and was sent home today from work due to his symptoms  Patient is COVID and flu vaccinated  He did not take any home COVID test   He denies chest pain shortness of breath nausea vomiting or diarrhea at this time  Review of Systems   Review of Systems   Constitutional: Positive for chills, fatigue and fever  Negative for activity change and appetite change  HENT: Positive for congestion, postnasal drip, rhinorrhea and sore throat  Negative for ear pain, sinus pressure, sinus pain and sneezing  Eyes: Negative for pain  Respiratory: Positive for cough  Negative for chest tightness, shortness of breath, wheezing and stridor  Cardiovascular: Negative for chest pain  Gastrointestinal: Negative  Negative for diarrhea, nausea and vomiting  Genitourinary: Negative  Musculoskeletal: Positive for myalgias  Skin: Negative  Neurological: Positive for headaches  All other systems reviewed and are negative  Current Medications       Current Outpatient Medications:   •  benzonatate (TESSALON) 200 MG capsule, Take 1 capsule (200 mg total) by mouth 3 (three) times a day as needed for cough, Disp: 20 capsule, Rfl: 0  •  naproxen (Naprosyn) 500 mg tablet, Take 1 tablet (500 mg total) by mouth 2 (two) times a day with meals for 5 days (Patient not taking: Reported on 10/31/2022), Disp: 10 tablet, Rfl: 0  •  Riboflavin 400 MG CAPS, Take 1 capsule (400 mg total) by mouth daily (Patient not taking: Reported on 1/26/2023), Disp: 30 capsule, Rfl: 1  •  rizatriptan (Maxalt) 10 mg tablet, Take 1 tablet (10 mg total) by mouth as needed for migraine Take at the onset of migraine; if symptoms continue or return, may take another dose at least 2 hours after first dose   Take no more than 2 doses in a day  (Patient not taking: Reported on 1/26/2023), Disp: 18 tablet, Rfl: 0    Current Allergies     Allergies as of 01/26/2023   • (No Known Allergies)              Past Medical History:   Diagnosis Date   • Chalazion of right upper eyelid    • COVID-19 05/2022       Past Surgical History:   Procedure Laterality Date   • HERNIA REPAIR     • LASIK         Family History   Adopted: Yes   Family history unknown: Yes         Medications have been verified  The following portions of the patient's history were reviewed and updated as appropriate: allergies, current medications, past family history, past medical history, past social history, past surgical history and problem list     Objective   /74   Pulse 86   Temp (!) 97 1 °F (36 2 °C)   Resp 18   SpO2 99%      Physical Exam     Physical Exam  Constitutional:       General: He is awake  He is not in acute distress  Appearance: He is well-developed  HENT:      Head: Normocephalic  Right Ear: Hearing, tympanic membrane, ear canal and external ear normal       Left Ear: Hearing, tympanic membrane, ear canal and external ear normal       Nose: Congestion and rhinorrhea present  Right Turbinates: Swollen  Left Turbinates: Swollen  Right Sinus: No maxillary sinus tenderness  Left Sinus: No maxillary sinus tenderness  Mouth/Throat:      Lips: Pink  Mouth: Mucous membranes are moist  No oral lesions  Palate: No mass  Pharynx: Uvula midline  Pharyngeal swelling, posterior oropharyngeal erythema and uvula swelling present  No oropharyngeal exudate  Tonsils: No tonsillar exudate or tonsillar abscesses  1+ on the right  1+ on the left  Comments: Tonsils bilaterally are swollen and red, no exudate no petechiae, clear postnasal drip noted in the back of throat  Cardiovascular:      Rate and Rhythm: Normal rate and regular rhythm  Heart sounds: Normal heart sounds     Pulmonary:      Effort: Pulmonary effort is normal       Breath sounds: Normal breath sounds and air entry  No decreased breath sounds, wheezing, rhonchi or rales  Musculoskeletal:      Cervical back: Normal range of motion  Skin:     General: Skin is warm  Capillary Refill: Capillary refill takes less than 2 seconds  Neurological:      General: No focal deficit present  Mental Status: He is alert and oriented to person, place, and time  Psychiatric:         Attention and Perception: Attention normal          Mood and Affect: Mood normal          Behavior: Behavior is cooperative  Note: Portions of this record may have been created with voice recognition software  Occasional wrong word or "sound a like" substitutions may have occurred due to the inherent limitations of voice recognition software  Please read the chart carefully and recognize, using context, where substitutions have occurred  TIERRA Hu

## 2023-01-26 NOTE — LETTER
January 26, 2023     Patient: Denisse   YOB: 1980  Date of Visit: 1/26/2023      To Whom it May Concern:    Denisse Alfaro is under my professional care  Shelby Carlton was seen in my office on 1/26/2023  Shelby Kimberlykillian may return to work 01/29/2023 as long as fever free and labs are negative  If labs are positive pt needs to wear a mask for the next 5 days strictly            Sincerely,          TIERRA Bhandari        CC: No Recipients

## 2023-01-27 LAB
FLUAV RNA RESP QL NAA+PROBE: NEGATIVE
FLUBV RNA RESP QL NAA+PROBE: NEGATIVE
SARS-COV-2 RNA RESP QL NAA+PROBE: NEGATIVE

## 2023-01-28 LAB — BACTERIA THROAT CULT: NORMAL

## 2023-06-05 ENCOUNTER — OFFICE VISIT (OUTPATIENT)
Dept: URGENT CARE | Age: 43
End: 2023-06-05
Payer: COMMERCIAL

## 2023-06-05 VITALS
RESPIRATION RATE: 18 BRPM | OXYGEN SATURATION: 98 % | WEIGHT: 162 LBS | BODY MASS INDEX: 26.15 KG/M2 | SYSTOLIC BLOOD PRESSURE: 130 MMHG | TEMPERATURE: 97 F | DIASTOLIC BLOOD PRESSURE: 96 MMHG | HEART RATE: 74 BPM

## 2023-06-05 DIAGNOSIS — M79.661 BILATERAL CALF PAIN: Primary | ICD-10-CM

## 2023-06-05 DIAGNOSIS — M79.662 BILATERAL CALF PAIN: Primary | ICD-10-CM

## 2023-06-05 PROCEDURE — 99213 OFFICE O/P EST LOW 20 MIN: CPT | Performed by: NURSE PRACTITIONER

## 2023-06-05 RX ORDER — NAPROXEN 500 MG/1
500 TABLET ORAL 2 TIMES DAILY WITH MEALS
Qty: 20 TABLET | Refills: 0 | Status: SHIPPED | OUTPATIENT
Start: 2023-06-05 | End: 2023-06-05

## 2023-06-05 RX ORDER — NAPROXEN 500 MG/1
500 TABLET ORAL 2 TIMES DAILY WITH MEALS
Qty: 20 TABLET | Refills: 0 | Status: SHIPPED | OUTPATIENT
Start: 2023-06-05

## 2023-06-05 NOTE — PROGRESS NOTES
NAME: Yanely Hirsch is a 37 y o  male  : 1980    MRN: [id number]    /96   Pulse 74   Temp (!) 97 °F (36 1 °C) (Tympanic)   Resp 18   Wt 73 5 kg (162 lb)   SpO2 98%   BMI 26 15 kg/m²     7:57 PM    Assessment and Plan   Bilateral calf pain [M79 661, M79 662]  1  Bilateral calf pain  naproxen (Naprosyn) 500 mg tablet    DISCONTINUED: naproxen (Naprosyn) 500 mg tablet          Junior Body was seen today for leg pain  Diagnoses and all orders for this visit:    Bilateral calf pain  -     Discontinue: naproxen (Naprosyn) 500 mg tablet; Take 1 tablet (500 mg total) by mouth 2 (two) times a day with meals  -     naproxen (Naprosyn) 500 mg tablet; Take 1 tablet (500 mg total) by mouth 2 (two) times a day with meals        Patient Instructions   Patient Instructions   Continue taking Tylenol Motrin  If Pain becomes worse numbness or tingling begin trouble walking go to emergency room right away  Discussed with patient low risk for blood clot due to being bilaterally and symptoms on and off for 1-1/2 weeks  Discussed using NSAIDs such as naproxen prescription printed and given to patient  Patient is to be on a flight on Wednesday discussed wearing compression stockings at that time  Discussed with patient follow with family doctor for further evaluation if symptoms do not improve  Aware to go to the ER if symptoms worsen        Proceed to the nearest ER if symptoms worsen, Follow up with your PCP  Continue to social distance, wash your hands, and wear your masks  Please continue to follow the CDC  gov guidelines daily for they are subject to change on COVID-19    Chief Complaint     Chief Complaint   Patient presents with   • Leg Pain     Pt c/o b/l calf pain, pain is aching, throbbing and burning in nature  Hurts more with walking  Pain comes and goes  Sx started about 1 5 weeks ago  Taking Aleve without much relief  No noted injury            History of Present Illness     37year-old patient here "today with bilateral calf pain  He describes both calves for the past 1 to 2 weeks as throbbing and burning in nature  It hurts him to walk where the pain goes from the base of his calf to his mid calf bilaterally  Patient denies any injury or trauma, denies any excessive walking bicycling injury or trauma  Patient has no history of blood clots  Patient's calves bilaterally are soft and equal in size  There is no redness bruising or signs of injury to either calf  Patient was concerned that maybe he has \"Lyme disease\" however discussed with patient he will need to follow-up with his family doctor for we do not obtain blood work at the office of urgent care  Patient verbalizes he understands  He used Motrin intermittently with some relief  The only thing patient can recall is that when he went camping about 2 weeks ago that he stood in place for long period of time  Patient denies have any chest pain shortness of breath  Patient denies having any cough numbness tingling to either extremity of the body  Review of Systems   Review of Systems   Respiratory: Negative  Negative for shortness of breath  Musculoskeletal: Positive for gait problem (pain at times when walking in the calfs  )  Bilateral calf pain   Skin: Negative            Current Medications       Current Outpatient Medications:   •  naproxen (Naprosyn) 500 mg tablet, Take 1 tablet (500 mg total) by mouth 2 (two) times a day with meals, Disp: 20 tablet, Rfl: 0  •  benzonatate (TESSALON) 200 MG capsule, Take 1 capsule (200 mg total) by mouth 3 (three) times a day as needed for cough (Patient not taking: Reported on 6/5/2023), Disp: 20 capsule, Rfl: 0  •  Riboflavin 400 MG CAPS, Take 1 capsule (400 mg total) by mouth daily (Patient not taking: Reported on 1/26/2023), Disp: 30 capsule, Rfl: 1  •  rizatriptan (Maxalt) 10 mg tablet, Take 1 tablet (10 mg total) by mouth as needed for migraine Take at the onset of migraine; if symptoms " continue or return, may take another dose at least 2 hours after first dose  Take no more than 2 doses in a day  (Patient not taking: Reported on 1/26/2023), Disp: 18 tablet, Rfl: 0    Current Allergies     Allergies as of 06/05/2023   • (No Known Allergies)              Past Medical History:   Diagnosis Date   • Chalazion of right upper eyelid    • COVID-19 05/2022       Past Surgical History:   Procedure Laterality Date   • HERNIA REPAIR     • LASIK         Family History   Adopted: Yes   Family history unknown: Yes         Medications have been verified  The following portions of the patient's history were reviewed and updated as appropriate: allergies, current medications, past family history, past medical history, past social history, past surgical history and problem list     Objective   /96   Pulse 74   Temp (!) 97 °F (36 1 °C) (Tympanic)   Resp 18   Wt 73 5 kg (162 lb)   SpO2 98%   BMI 26 15 kg/m²      Physical Exam     Physical Exam  Constitutional:       Appearance: Normal appearance  Cardiovascular:      Rate and Rhythm: Normal rate and regular rhythm  Heart sounds: Normal heart sounds  Pulmonary:      Effort: Pulmonary effort is normal       Breath sounds: Normal breath sounds and air entry  No decreased breath sounds, wheezing or rhonchi  Musculoskeletal:      Right lower leg: Tenderness present  No swelling or deformity  No edema  Left lower leg: Tenderness present  No swelling or deformity  No edema  Right ankle: Normal       Left ankle: Normal       Right foot: Normal       Left foot: Normal         Legs:    Skin:     General: Skin is warm  Capillary Refill: Capillary refill takes less than 2 seconds  Neurological:      General: No focal deficit present  Mental Status: He is alert and oriented to person, place, and time     Psychiatric:         Attention and Perception: Attention normal          Mood and Affect: Mood normal          Speech: Speech "normal          Behavior: Behavior normal        Pt ambulated out of the office with no discomfort or altered gait  Discussed with patient having blood clots to both lower legs is unlikely but possibility  Discussed going to the ER for further evaluation patient declines at this time  Patient ambulated out of the office with a steady gait and with little discomfort noted  Note: Portions of this record may have been created with voice recognition software  Occasional wrong word or \"sound a like\" substitutions may have occurred due to the inherent limitations of voice recognition software  Please read the chart carefully and recognize, using context, where substitutions have occurred  TIERRA Zarate  "

## 2023-06-05 NOTE — PATIENT INSTRUCTIONS
Continue taking Tylenol Motrin  If Pain becomes worse numbness or tingling begin trouble walking go to emergency room right away  Discussed with patient low risk for blood clot due to being bilaterally and symptoms on and off for 1-1/2 weeks  Discussed using NSAIDs such as naproxen prescription printed and given to patient  Patient is to be on a flight on Wednesday discussed wearing compression stockings at that time  Discussed with patient follow with family doctor for further evaluation if symptoms do not improve    Aware to go to the ER if symptoms worsen

## 2023-06-07 ENCOUNTER — OFFICE VISIT (OUTPATIENT)
Dept: FAMILY MEDICINE CLINIC | Facility: CLINIC | Age: 43
End: 2023-06-07
Payer: COMMERCIAL

## 2023-06-07 VITALS
WEIGHT: 161.8 LBS | RESPIRATION RATE: 16 BRPM | TEMPERATURE: 96.5 F | OXYGEN SATURATION: 100 % | DIASTOLIC BLOOD PRESSURE: 88 MMHG | BODY MASS INDEX: 26.12 KG/M2 | HEART RATE: 88 BPM | SYSTOLIC BLOOD PRESSURE: 124 MMHG

## 2023-06-07 DIAGNOSIS — M79.661 BILATERAL CALF PAIN: Primary | ICD-10-CM

## 2023-06-07 DIAGNOSIS — M79.662 BILATERAL CALF PAIN: Primary | ICD-10-CM

## 2023-06-07 PROCEDURE — 99213 OFFICE O/P EST LOW 20 MIN: CPT | Performed by: NURSE PRACTITIONER

## 2023-06-07 NOTE — PROGRESS NOTES
Name: Yoanna Price      : 1980      MRN: [id number]  Encounter Provider: TIERRA Rodarte  Encounter Date: 2023   [facility]  Bilateral calf pain  Assessment & Plan:  Improving a bit with naproxen 500 mg bid  Continue for the rest of the week, ok to travel tomorrow  No findings suspicious for dvt on exam, normal temp, color, and he is non-tender to deep palpation  Will get bmp, lyme test   Pt should call if symptoms worsen/persist     Orders:  -     Basic metabolic panel; Future  -     Lyme Total Antibody Profile with reflex to WB; Future         Subjective      Pt is a 37 y o  y/o male who is seen today for evaluation of calf pain  PMH of migraines, ED  Seen at urgent care  with complaint of bilateral calf pain for over a week  He described symptoms as throbbing and burning, hurting him to walk  There was no trauma or injury, no history of DVT  His calves were noted to be symetrical, soft, no redness/discoloration  His concern was lyme disease and was told they do not order blood work in that setting  He had no other symptoms, was advised to take naproxen, this is helping a bit and it's graduallly getting a bit better  Today, he describes pain as achy deep in his calf muscles for the last 2 weeks  He says it feels like he did a lot of strenuous hiking, but he did not  Prior to onset he was camping, mostly sat around on that trip and played corn hole  He says his symptoms seem to come and go, walking makes it worse  His job is sedentary  He denies swelling, redness, tenderness to palpation  He also says his knees are stiff/sore with very mild swelling for a short time which is now resolved  He rates calf pain 4-5/10, knee pain also 4-5/10  He says he is flying to [place]  Review of Systems   Constitutional: Negative for chills and fever  Respiratory: Negative for shortness of breath  Cardiovascular: Negative for chest pain and palpitations  Musculoskeletal: Positive for arthralgias and myalgias  Negative for gait problem  Skin: Negative for color change  Neurological: Negative for weakness and numbness  Current Outpatient Medications on File Prior to Visit   Medication Sig   • naproxen (Naprosyn) 500 mg tablet Take 1 tablet (500 mg total) by mouth 2 (two) times a day with meals   • benzonatate (TESSALON) 200 MG capsule Take 1 capsule (200 mg total) by mouth 3 (three) times a day as needed for cough (Patient not taking: Reported on 6/5/2023)   • Riboflavin 400 MG CAPS Take 1 capsule (400 mg total) by mouth daily (Patient not taking: Reported on 1/26/2023)   • rizatriptan (Maxalt) 10 mg tablet Take 1 tablet (10 mg total) by mouth as needed for migraine Take at the onset of migraine; if symptoms continue or return, may take another dose at least 2 hours after first dose  Take no more than 2 doses in a day  (Patient not taking: Reported on 1/26/2023)       Objective     /88   Pulse 88   Temp (!) 96 5 °F (35 8 °C)   Resp 16   Wt 73 4 kg (161 lb 12 8 oz)   SpO2 100%   BMI 26 12 kg/m²     Physical Exam  Vitals reviewed  Constitutional:       General: He is awake  He is not in acute distress  Appearance: Normal appearance  He is well-developed and well-groomed  He is not ill-appearing  Eyes:      General: Lids are normal       Conjunctiva/sclera: Conjunctivae normal    Cardiovascular:      Rate and Rhythm: Normal rate and regular rhythm  Pulses: Normal pulses  Dorsalis pedis pulses are 2+ on the right side and 2+ on the left side  Heart sounds: Normal heart sounds  No murmur heard  Pulmonary:      Effort: Pulmonary effort is normal  No respiratory distress  Breath sounds: Normal breath sounds  Musculoskeletal:      Right knee: Normal       Left knee: Normal       Right lower leg: No swelling or tenderness  No edema        Left lower leg: No swelling or tenderness  No edema  Comments: Non tender calves with palpation, he does groan a bit when he gets up and down off of table   Feet:      Right foot:      Skin integrity: Skin integrity normal       Left foot:      Skin integrity: Skin integrity normal    Skin:     General: Skin is warm and dry  Capillary Refill: Capillary refill takes less than 2 seconds  Findings: No bruising or erythema  Neurological:      Mental Status: He is alert and oriented to person, place, and time  Motor: Motor function is intact  Psychiatric:         Attention and Perception: Attention normal          Mood and Affect: Mood normal          Speech: Speech normal          Behavior: Behavior normal  Behavior is cooperative  Thought Content:  Thought content normal          Judgment: Judgment normal        TIERRA Goodman

## 2023-06-07 NOTE — ASSESSMENT & PLAN NOTE
Improving a bit with naproxen 500 mg bid  Continue for the rest of the week, ok to travel tomorrow  No findings suspicious for dvt on exam, normal temp, color, and he is non-tender to deep palpation    Will get bmp, lyme test   Pt should call if symptoms worsen/persist

## 2023-12-04 ENCOUNTER — PREP FOR PROCEDURE (OUTPATIENT)
Age: 43
End: 2023-12-04

## 2023-12-04 ENCOUNTER — TELEPHONE (OUTPATIENT)
Age: 43
End: 2023-12-04

## 2023-12-04 DIAGNOSIS — Z12.11 SCREENING FOR COLON CANCER: Primary | ICD-10-CM

## 2023-12-04 NOTE — TELEPHONE ENCOUNTER
Scheduled date of colonoscopy (as of today):1/18/2024  Physician performing colonoscopy:   Location of colonoscopy: [facility]  Bowel prep reviewed with patient: Elizabeth Pal  Instructions reviewed with patient by: Elizabeth Pal  Clearances: N/A      Miralax Dulcolax Prep Sent via [employer]

## 2023-12-06 ENCOUNTER — RA CDI HCC (OUTPATIENT)
Dept: OTHER | Facility: HOSPITAL | Age: 43
End: 2023-12-06

## 2023-12-15 ENCOUNTER — OFFICE VISIT (OUTPATIENT)
Dept: FAMILY MEDICINE CLINIC | Facility: CLINIC | Age: 43
End: 2023-12-15
Payer: COMMERCIAL

## 2023-12-15 VITALS
SYSTOLIC BLOOD PRESSURE: 136 MMHG | RESPIRATION RATE: 16 BRPM | BODY MASS INDEX: 26.53 KG/M2 | TEMPERATURE: 96.1 F | OXYGEN SATURATION: 97 % | HEART RATE: 82 BPM | DIASTOLIC BLOOD PRESSURE: 88 MMHG | WEIGHT: 169 LBS | HEIGHT: 67 IN

## 2023-12-15 DIAGNOSIS — R03.0 ELEVATED BP WITHOUT DIAGNOSIS OF HYPERTENSION: ICD-10-CM

## 2023-12-15 DIAGNOSIS — L71.9 ROSACEA: ICD-10-CM

## 2023-12-15 DIAGNOSIS — Z00.00 ENCOUNTER FOR ANNUAL PHYSICAL EXAM: Primary | ICD-10-CM

## 2023-12-15 DIAGNOSIS — R05.2 SUBACUTE COUGH: ICD-10-CM

## 2023-12-15 PROCEDURE — 99396 PREV VISIT EST AGE 40-64: CPT | Performed by: FAMILY MEDICINE

## 2023-12-15 RX ORDER — ALBUTEROL SULFATE 90 UG/1
2 AEROSOL, METERED RESPIRATORY (INHALATION) EVERY 6 HOURS PRN
Qty: 6.7 G | Refills: 0 | Status: SHIPPED | OUTPATIENT
Start: 2023-12-15

## 2023-12-15 RX ORDER — BENZONATATE 100 MG/1
100 CAPSULE ORAL 3 TIMES DAILY PRN
Qty: 21 CAPSULE | Refills: 0 | Status: SHIPPED | OUTPATIENT
Start: 2023-12-15

## 2023-12-15 RX ORDER — METRONIDAZOLE 7.5 MG/G
GEL TOPICAL 2 TIMES DAILY
Qty: 45 G | Refills: 1 | Status: SHIPPED | OUTPATIENT
Start: 2023-12-15

## 2023-12-15 NOTE — PROGRESS NOTES
[address] [facility]    NAME: Coleman Streeter  AGE: 37 y.o. SEX: male  : 1980     DATE: 12/15/2023     Assessment and Plan:     Problem List Items Addressed This Visit    None  Visit Diagnoses     Encounter for annual physical exam    -  Primary    Scheduled Colonscopy. Will discuss flu vaccination w/ partner. FBW ordered for next year    Relevant Orders    Lipid panel    Comprehensive metabolic panel    CBC and differential    Hepatitis C antibody    Rosacea        Papulopustular rosacea primarily on the forehead. Apply sunscreen. Limt spicy food. Recc gentle cleansers. Start metrogel twice a day. Relevant Medications    metroNIDAZOLE (METROGEL) 0.75 % gel    Subacute cough        Dry cough x 2 weeks. Lungs clear. Likely viral. Tessalon and albuterol ordered. Asked to call if sx do not improve    Relevant Medications    benzonatate (TESSALON PERLES) 100 mg capsule    albuterol (Ventolin HFA) 90 mcg/act inhaler    Elevated BP without diagnosis of hypertension        BP midlly elevated at 136/88. Does consume alot of caffeine but did not have any this am. Also not sleeping well w/ the baby. Will monitor            Immunizations and preventive care screenings were discussed with patient today. Appropriate education was printed on patient's after visit summary. Counseling:  Exercise: the importance of regular exercise/physical activity was discussed. Recommend exercise 3-5 times per week for at least 30 minutes. No follow-ups on file. Chief Complaint:     Chief Complaint   Patient presents with   • Physical Exam   • Cough     Onset x2 weeks.  Pt took OTC med with little relief      History of Present Illness:     Adult Annual Physical   Patient here for a comprehensive physical exam.   Presents with a cough x 2 weeks  Dry, lingering  Some wheezing but no SOB   No history of asthma  Headaches have improved and had 2 in the las year  We a baby 1 month ago  Has developed acne on the forehead for a month in half   No itching. No new allergens or changes face routine   Ingrowin hair on the chest that presented a week ago      Diet and Physical Activity  Diet/Nutrition: well balanced diet. Exercise:  hiked in the past but has not been able to exercise with the baby . Depression Screening  PHQ-2/9 Depression Screening         General Health  Sleep: sleeps well. Hearing: normal - bilateral.  Vision: no vision problems. Dental:  3 weeks ago .  Health  Symptoms include: ED     Review of Systems:     Review of Systems   Past Medical History:     Past Medical History:   Diagnosis Date   • Chalazion of right upper eyelid    • COVID-19 05/2022      Past Surgical History:     Past Surgical History:   Procedure Laterality Date   • HERNIA REPAIR     • LASIK        Family History:     Family History   Adopted: Yes   Family history unknown: Yes      Social History:     Social History     Socioeconomic History   • Marital status: Single     Spouse name: None   • Number of children: None   • Years of education: None   • Highest education level: None   Occupational History   • None   Tobacco Use   • Smoking status: Never   • Smokeless tobacco: Never   Vaping Use   • Vaping status: Never Used   Substance and Sexual Activity   • Alcohol use:  Yes     Alcohol/week: 2.0 standard drinks of alcohol     Types: 2 Glasses of wine per week   • Drug use: Not Currently     Types: Marijuana   • Sexual activity: Yes     Partners: Female   Other Topics Concern   • None   Social History Narrative   • None     Social Determinants of Health     Financial Resource Strain: Not on file   Food Insecurity: Not on file   Transportation Needs: Not on file   Physical Activity: Not on file   Stress: Not on file   Social Connections: Not on file   Intimate Partner Violence: Not on file   Housing Stability: Not on file      Current Medications:     Current Outpatient Medications   Medication Sig Dispense Refill   • albuterol (Ventolin HFA) 90 mcg/act inhaler Inhale 2 puffs every 6 (six) hours as needed for wheezing or shortness of breath (cough) 6.7 g 0   • benzonatate (TESSALON PERLES) 100 mg capsule Take 1 capsule (100 mg total) by mouth 3 (three) times a day as needed for cough 21 capsule 0   • metroNIDAZOLE (METROGEL) 0.75 % gel Apply topically 2 (two) times a day 45 g 1   • naproxen (Naprosyn) 500 mg tablet Take 1 tablet (500 mg total) by mouth 2 (two) times a day with meals (Patient not taking: Reported on 12/15/2023) 20 tablet 0   • Riboflavin 400 MG CAPS Take 1 capsule (400 mg total) by mouth daily (Patient not taking: Reported on 1/26/2023) 30 capsule 1   • rizatriptan (Maxalt) 10 mg tablet Take 1 tablet (10 mg total) by mouth as needed for migraine Take at the onset of migraine; if symptoms continue or return, may take another dose at least 2 hours after first dose. Take no more than 2 doses in a day. (Patient not taking: Reported on 1/26/2023) 18 tablet 0     No current facility-administered medications for this visit. Allergies:     No Known Allergies   Physical Exam:     /88 (BP Location: Left arm, Patient Position: Sitting, Cuff Size: Large)   Pulse 82   Temp (!) 96.1 °F (35.6 °C) (Tympanic)   Resp 16   Ht 5' 6.75" (1.695 m)   Wt 76.7 kg (169 lb)   SpO2 97%   BMI 26.67 kg/m²     Physical Exam  Constitutional:       General: He is not in acute distress. Appearance: Normal appearance. He is not ill-appearing or toxic-appearing. HENT:      Head: Normocephalic and atraumatic. Cardiovascular:      Rate and Rhythm: Normal rate and regular rhythm. Heart sounds: No murmur heard. Pulmonary:      Effort: Pulmonary effort is normal. No respiratory distress. Breath sounds: Normal breath sounds. No stridor. No wheezing, rhonchi or rales. Abdominal:      General: There is no distension. Palpations: Abdomen is soft.  There is no mass. Tenderness: There is no abdominal tenderness. There is no guarding or rebound. Hernia: No hernia is present. Skin:            Comments: Inflamed papule on the chest below the left nipple   Papulopustular lesions on the forehead with teleangiectasia    Neurological:      Mental Status: He is alert and oriented to person, place, and time.    Psychiatric:         Mood and Affect: Mood normal.         Behavior: Behavior normal.          Nav Toussaint MD  [address] [place] [address]

## 2024-01-08 ENCOUNTER — ANESTHESIA (OUTPATIENT)
Dept: ANESTHESIOLOGY | Facility: HOSPITAL | Age: 44
End: 2024-01-08

## 2024-01-08 ENCOUNTER — ANESTHESIA EVENT (OUTPATIENT)
Dept: ANESTHESIOLOGY | Facility: HOSPITAL | Age: 44
End: 2024-01-08

## 2024-01-16 RX ORDER — FENTANYL CITRATE/PF 50 MCG/ML
50 SYRINGE (ML) INJECTION
Status: CANCELLED | OUTPATIENT
Start: 2024-01-16

## 2024-01-16 RX ORDER — ONDANSETRON 2 MG/ML
4 INJECTION INTRAMUSCULAR; INTRAVENOUS ONCE AS NEEDED
Status: CANCELLED | OUTPATIENT
Start: 2024-01-16

## 2024-01-16 RX ORDER — SODIUM CHLORIDE 9 MG/ML
125 INJECTION, SOLUTION INTRAVENOUS CONTINUOUS
Status: CANCELLED | OUTPATIENT
Start: 2024-01-16

## 2024-01-18 ENCOUNTER — HOSPITAL ENCOUNTER (OUTPATIENT)
Dept: GASTROENTEROLOGY | Facility: MEDICAL CENTER | Age: 44
Setting detail: OUTPATIENT SURGERY
End: 2024-01-18
Payer: COMMERCIAL

## 2024-01-18 ENCOUNTER — ANESTHESIA EVENT (OUTPATIENT)
Dept: GASTROENTEROLOGY | Facility: MEDICAL CENTER | Age: 44
End: 2024-01-18

## 2024-01-18 ENCOUNTER — ANESTHESIA (OUTPATIENT)
Dept: GASTROENTEROLOGY | Facility: MEDICAL CENTER | Age: 44
End: 2024-01-18

## 2024-01-18 VITALS
TEMPERATURE: 98.3 F | HEART RATE: 77 BPM | SYSTOLIC BLOOD PRESSURE: 122 MMHG | OXYGEN SATURATION: 97 % | DIASTOLIC BLOOD PRESSURE: 71 MMHG | RESPIRATION RATE: 18 BRPM

## 2024-01-18 DIAGNOSIS — Z12.11 SCREENING FOR COLON CANCER: ICD-10-CM

## 2024-01-18 PROCEDURE — 45385 COLONOSCOPY W/LESION REMOVAL: CPT | Performed by: INTERNAL MEDICINE

## 2024-01-18 PROCEDURE — 88305 TISSUE EXAM BY PATHOLOGIST: CPT | Performed by: PATHOLOGY

## 2024-01-18 RX ORDER — SODIUM CHLORIDE 9 MG/ML
125 INJECTION, SOLUTION INTRAVENOUS CONTINUOUS
Status: DISCONTINUED | OUTPATIENT
Start: 2024-01-18 | End: 2024-01-22 | Stop reason: HOSPADM

## 2024-01-18 RX ORDER — PROPOFOL 10 MG/ML
INJECTION, EMULSION INTRAVENOUS AS NEEDED
Status: DISCONTINUED | OUTPATIENT
Start: 2024-01-18 | End: 2024-01-18

## 2024-01-18 RX ORDER — LIDOCAINE HYDROCHLORIDE 20 MG/ML
INJECTION, SOLUTION EPIDURAL; INFILTRATION; INTRACAUDAL; PERINEURAL AS NEEDED
Status: DISCONTINUED | OUTPATIENT
Start: 2024-01-18 | End: 2024-01-18

## 2024-01-18 RX ADMIN — Medication 40 MG: at 08:55

## 2024-01-18 RX ADMIN — LIDOCAINE HYDROCHLORIDE 60 MG: 20 INJECTION, SOLUTION EPIDURAL; INFILTRATION; INTRACAUDAL at 08:53

## 2024-01-18 RX ADMIN — PROPOFOL 50 MG: 10 INJECTION, EMULSION INTRAVENOUS at 08:56

## 2024-01-18 RX ADMIN — PROPOFOL 50 MG: 10 INJECTION, EMULSION INTRAVENOUS at 08:59

## 2024-01-18 RX ADMIN — SODIUM CHLORIDE 125 ML/HR: 0.9 INJECTION, SOLUTION INTRAVENOUS at 07:33

## 2024-01-18 RX ADMIN — PROPOFOL 100 MG: 10 INJECTION, EMULSION INTRAVENOUS at 08:53

## 2024-01-18 RX ADMIN — PROPOFOL 50 MG: 10 INJECTION, EMULSION INTRAVENOUS at 08:54

## 2024-01-18 NOTE — ANESTHESIA POSTPROCEDURE EVALUATION
Post-Op Assessment Note    CV Status:  Stable    Pain management: adequate       Mental Status:  Alert and awake   Hydration Status:  Euvolemic   PONV Controlled:  Controlled   Airway Patency:  Patent     Post Op Vitals Reviewed: Yes      Staff: Anesthesiologist               /55 (01/18/24 0909)    Temp      Pulse 84 (01/18/24 0909)   Resp 18 (01/18/24 0909)    SpO2 97 % (01/18/24 0909)

## 2024-01-18 NOTE — ANESTHESIA PREPROCEDURE EVALUATION
Procedure:  COLONOSCOPY    Relevant Problems   ANESTHESIA (within normal limits)      CARDIO   (+) Intractable ophthalmoplegic migraine      MUSCULOSKELETAL   (+) Intractable ophthalmoplegic migraine      NEURO/PSYCH   (+) Intractable ophthalmoplegic migraine        Physical Exam    Airway    Mallampati score: II  TM Distance: >3 FB  Neck ROM: full     Dental       Cardiovascular  Rhythm: regular, Rate: normal    Pulmonary   Breath sounds clear to auscultation    Other Findings        Anesthesia Plan  ASA Score- 2     Anesthesia Type- IV sedation with anesthesia with ASA Monitors.         Additional Monitors:     Airway Plan:            Plan Factors-Exercise tolerance (METS): >4 METS.    Chart reviewed.    Patient summary reviewed.    Patient is not a current smoker.              Induction- intravenous.    Postoperative Plan-     Informed Consent- Anesthetic plan and risks discussed with patient.

## 2024-01-18 NOTE — H&P
History and Physical -  [facility]  Abdulaziz Hart 43 y.o. male MRN: [id number]                  HPI: Abdulaziz Hart is a 43 y.o. year old male who presents for colonoscopy for colon cancer screening.       REVIEW OF SYSTEMS: Per the HPI, and otherwise unremarkable.    Historical Information   Past Medical History:   Diagnosis Date    Chalazion of right upper eyelid     COVID-19 05/2022     Past Surgical History:   Procedure Laterality Date    HERNIA REPAIR      LASIK       Social History   Social History     Substance and Sexual Activity   Alcohol Use Yes    Alcohol/week: 2.0 standard drinks of alcohol    Types: 2 Glasses of wine per week     Social History     Substance and Sexual Activity   Drug Use Not Currently    Types: Marijuana     Social History     Tobacco Use   Smoking Status Never   Smokeless Tobacco Never     Family History   Adopted: Yes   Family history unknown: Yes       Meds/Allergies     (Not in a hospital admission)      No Known Allergies    Objective     Blood pressure 139/78, pulse 94, temperature 98.3 °F (36.8 °C), resp. rate 16, SpO2 99%.      PHYSICAL EXAM    Gen: NAD  CV: RRR  CHEST: Clear  ABD: soft, NT/ND  EXT: no edema      ASSESSMENT/PLAN:  Abdulaziz Hart is a 43 y.o. year old male who presents for colonoscopy for colon cancer screening.  The patient is stable and optimized for the procedure, we reviewed risk and benefits. Risk include but not limited to infection, bleeding, perforation and missing a lesion.

## 2024-01-22 PROCEDURE — 88305 TISSUE EXAM BY PATHOLOGIST: CPT | Performed by: PATHOLOGY

## 2024-04-10 ENCOUNTER — OFFICE VISIT (OUTPATIENT)
Dept: URGENT CARE | Age: 44
End: 2024-04-10
Payer: COMMERCIAL

## 2024-04-10 VITALS
DIASTOLIC BLOOD PRESSURE: 84 MMHG | SYSTOLIC BLOOD PRESSURE: 126 MMHG | RESPIRATION RATE: 20 BRPM | OXYGEN SATURATION: 99 % | TEMPERATURE: 98.4 F | BODY MASS INDEX: 25.71 KG/M2 | HEIGHT: 66 IN | HEART RATE: 93 BPM | WEIGHT: 160 LBS

## 2024-04-10 DIAGNOSIS — J02.9 SORE THROAT: Primary | ICD-10-CM

## 2024-04-10 DIAGNOSIS — J02.0 STREP PHARYNGITIS: ICD-10-CM

## 2024-04-10 LAB — S PYO AG THROAT QL: POSITIVE

## 2024-04-10 PROCEDURE — 87880 STREP A ASSAY W/OPTIC: CPT

## 2024-04-10 PROCEDURE — 99213 OFFICE O/P EST LOW 20 MIN: CPT

## 2024-04-10 RX ORDER — AMOXICILLIN 500 MG/1
500 CAPSULE ORAL EVERY 12 HOURS SCHEDULED
Qty: 20 CAPSULE | Refills: 0 | Status: SHIPPED | OUTPATIENT
Start: 2024-04-10 | End: 2024-04-20

## 2024-04-10 RX ORDER — METHYLPREDNISOLONE 4 MG/1
TABLET ORAL
Qty: 21 TABLET | Refills: 0 | Status: SHIPPED | OUTPATIENT
Start: 2024-04-10

## 2024-04-10 NOTE — PROGRESS NOTES
[facility]        NAME: Abdulaziz Hart is a 43 y.o. male  : 1980    MRN: [id number]  DATE: April 10, 2024  TIME: 4:44 PM    Assessment and Plan   Sore throat [J02.9]  1. Sore throat  POCT rapid ANTIGEN strepA      2. Strep pharyngitis  amoxicillin (AMOXIL) 500 mg capsule    methylPREDNISolone 4 MG tablet therapy pack        Sore throat, painful swallowing, chills. HA- POCT strep positive.     Patient Instructions       Follow up with PCP in 3-5 days.  Proceed to  ER if symptoms worsen.    If tests have been performed at [facility], our office will contact you with results if changes need to be made to the care plan discussed with you at the visit.  [facility]    Chief Complaint     Chief Complaint   Patient presents with    Sore Throat     Pt reports starting 1 1/2 days ago with right swollen gland, sore throat and painful swallowing along with headache.  Denies fevers. Taking Advil, last dose 7:30am.           History of Present Illness       Sore throat, painful swallowing, chills. HA- POCT strep positive.     Sore Throat   Associated symptoms include headaches and trouble swallowing.       Review of Systems   Review of Systems   Constitutional:  Positive for activity change and chills.   HENT:  Positive for sore throat and trouble swallowing.    Neurological:  Positive for headaches.         Current Medications       Current Outpatient Medications:     amoxicillin (AMOXIL) 500 mg capsule, Take 1 capsule (500 mg total) by mouth every 12 (twelve) hours for 10 days, Disp: 20 capsule, Rfl: 0    methylPREDNISolone 4 MG tablet therapy pack, Use as directed on package, Disp: 21 tablet, Rfl: 0    albuterol (Ventolin HFA) 90 mcg/act inhaler, Inhale 2 puffs every 6 (six) hours as needed for wheezing or shortness of breath (cough), Disp: 6.7 g, Rfl: 0    benzonatate (TESSALON PERLES) 100 mg capsule, Take 1 capsule (100 mg total) by mouth 3 (three) times a day as  "needed for cough, Disp: 21 capsule, Rfl: 0    metroNIDAZOLE (METROGEL) 0.75 % gel, Apply topically 2 (two) times a day, Disp: 45 g, Rfl: 1    naproxen (Naprosyn) 500 mg tablet, Take 1 tablet (500 mg total) by mouth 2 (two) times a day with meals (Patient not taking: Reported on 12/15/2023), Disp: 20 tablet, Rfl: 0    Riboflavin 400 MG CAPS, Take 1 capsule (400 mg total) by mouth daily (Patient not taking: Reported on 1/26/2023), Disp: 30 capsule, Rfl: 1    rizatriptan (Maxalt) 10 mg tablet, Take 1 tablet (10 mg total) by mouth as needed for migraine Take at the onset of migraine; if symptoms continue or return, may take another dose at least 2 hours after first dose. Take no more than 2 doses in a day. (Patient not taking: Reported on 1/26/2023), Disp: 18 tablet, Rfl: 0    Current Allergies     Allergies as of 04/10/2024    (No Known Allergies)            The following portions of the patient's history were reviewed and updated as appropriate: allergies, current medications, past family history, past medical history, past social history, past surgical history and problem list.     Past Medical History:   Diagnosis Date    Chalazion of right upper eyelid     COVID-19 05/2022       Past Surgical History:   Procedure Laterality Date    HERNIA REPAIR      LASIK         Family History   Adopted: Yes   Family history unknown: Yes         Medications have been verified.        Objective   /84 (BP Location: Right arm, Patient Position: Sitting, Cuff Size: Standard)   Pulse 93   Temp 98.4 °F (36.9 °C) (Tympanic)   Resp 20   Ht 5' 6\" (1.676 m)   Wt 72.6 kg (160 lb)   SpO2 99%   BMI 25.82 kg/m²   No LMP for male patient.       Physical Exam     Physical Exam  Vitals reviewed.   Constitutional:       Appearance: He is well-developed.   HENT:      Mouth/Throat:      Pharynx: Posterior oropharyngeal erythema present.      Tonsils: Tonsillar exudate present. 2+ on the right. 2+ on the left.   Cardiovascular:      Rate " and Rhythm: Normal rate and regular rhythm.   Pulmonary:      Effort: Pulmonary effort is normal.      Breath sounds: Normal breath sounds.   Lymphadenopathy:      Cervical: Cervical adenopathy present.   Neurological:      Mental Status: He is alert.

## 2024-04-10 NOTE — LETTER
April 10, 2024     Patient: Abdulaziz Hart   YOB: 1980   Date of Visit: 4/10/2024       To Whom it May Concern:    Abdulaziz Hart was seen in my clinic on 4/10/2024. He may return to work 04/11/2024 if fever free. Please excuse his early departure from work to seek medical care.    If you have any questions or concerns, please don't hesitate to call.         Sincerely,          TIERRA Kidd        CC: No Recipients

## 2024-09-13 ENCOUNTER — OFFICE VISIT (OUTPATIENT)
Dept: URGENT CARE | Age: 44
End: 2024-09-13
Payer: COMMERCIAL

## 2024-09-13 VITALS
DIASTOLIC BLOOD PRESSURE: 87 MMHG | TEMPERATURE: 98.3 F | RESPIRATION RATE: 18 BRPM | SYSTOLIC BLOOD PRESSURE: 138 MMHG | OXYGEN SATURATION: 98 % | WEIGHT: 167.6 LBS | HEIGHT: 66 IN | BODY MASS INDEX: 26.93 KG/M2 | HEART RATE: 94 BPM

## 2024-09-13 DIAGNOSIS — J01.41 ACUTE RECURRENT PANSINUSITIS: Primary | ICD-10-CM

## 2024-09-13 PROCEDURE — 99213 OFFICE O/P EST LOW 20 MIN: CPT | Performed by: EMERGENCY MEDICINE

## 2024-09-13 RX ORDER — FLUTICASONE PROPIONATE 50 MCG
2 SPRAY, SUSPENSION (ML) NASAL DAILY
Qty: 15.8 ML | Refills: 3 | Status: SHIPPED | OUTPATIENT
Start: 2024-09-13 | End: 2024-10-13

## 2024-09-13 RX ORDER — PREDNISONE 20 MG/1
60 TABLET ORAL DAILY
Qty: 15 TABLET | Refills: 0 | Status: SHIPPED | OUTPATIENT
Start: 2024-09-13 | End: 2024-09-18

## 2024-09-13 NOTE — PROGRESS NOTES
[facility]        NAME: Abdulaziz Hart is a 44 y.o. male  : 1980    MRN: [id number]  DATE: 2024  TIME: 6:56 PM    Assessment and Plan   Acute recurrent pansinusitis [J01.41]  1. Acute recurrent pansinusitis  amoxicillin-clavulanate (AUGMENTIN) 875-125 mg per tablet    predniSONE 20 mg tablet    fluticasone (FLONASE) 50 mcg/act nasal spray            Patient Instructions       Follow up with PCP in 3-5 days.  Proceed to  ER if symptoms worsen.    If tests have been performed at [facility], our office will contact you with results if changes need to be made to the care plan discussed with you at the visit.  [facility]    Chief Complaint     Chief Complaint   Patient presents with    Cough    Nasal Congestion     Patient reports symptoms started 10 days ago, daughter is sick as well, attends . Using OTC with no relief.          History of Present Illness       Sinusitis  This is a new problem. The current episode started 1 to 4 weeks ago. The problem has been waxing and waning since onset. There has been no fever. His pain is at a severity of 5/10. The pain is moderate. Associated symptoms include chills, congestion, coughing, sinus pressure, sneezing, a sore throat and swollen glands. Pertinent negatives include no diaphoresis, ear pain, headaches, hoarse voice, neck pain or shortness of breath. Past treatments include antibiotics. The treatment provided mild relief.       Review of Systems   Review of Systems   Constitutional:  Positive for chills. Negative for activity change, appetite change, diaphoresis, fatigue, fever and unexpected weight change.   HENT:  Positive for congestion, postnasal drip, rhinorrhea, sinus pressure, sinus pain, sneezing and sore throat. Negative for dental problem, drooling, ear discharge, ear pain, facial swelling, hearing loss, hoarse voice, mouth sores, nosebleeds, tinnitus, trouble swallowing and voice  change.    Eyes:  Negative for pain and visual disturbance.   Respiratory:  Positive for cough. Negative for apnea, choking, chest tightness, shortness of breath, wheezing and stridor.    Cardiovascular:  Negative for chest pain, palpitations and leg swelling.   Gastrointestinal:  Negative for abdominal distention, abdominal pain, anal bleeding, blood in stool, constipation, diarrhea, nausea, rectal pain and vomiting.   Genitourinary:  Negative for dysuria and hematuria.   Musculoskeletal:  Negative for arthralgias, back pain, gait problem, joint swelling, myalgias, neck pain and neck stiffness.   Skin:  Negative for color change, pallor, rash and wound.   Neurological:  Negative for dizziness, tremors, seizures, syncope, facial asymmetry, speech difficulty, weakness, light-headedness, numbness and headaches.   All other systems reviewed and are negative.        Current Medications       Current Outpatient Medications:     amoxicillin-clavulanate (AUGMENTIN) 875-125 mg per tablet, Take 1 tablet by mouth 2 (two) times a day for 7 days, Disp: 14 tablet, Rfl: 0    fluticasone (FLONASE) 50 mcg/act nasal spray, 2 sprays into each nostril daily, Disp: 15.8 mL, Rfl: 3    predniSONE 20 mg tablet, Take 3 tablets (60 mg total) by mouth daily for 5 days, Disp: 15 tablet, Rfl: 0    albuterol (Ventolin HFA) 90 mcg/act inhaler, Inhale 2 puffs every 6 (six) hours as needed for wheezing or shortness of breath (cough), Disp: 6.7 g, Rfl: 0    benzonatate (TESSALON PERLES) 100 mg capsule, Take 1 capsule (100 mg total) by mouth 3 (three) times a day as needed for cough, Disp: 21 capsule, Rfl: 0    methylPREDNISolone 4 MG tablet therapy pack, Use as directed on package, Disp: 21 tablet, Rfl: 0    metroNIDAZOLE (METROGEL) 0.75 % gel, Apply topically 2 (two) times a day, Disp: 45 g, Rfl: 1    naproxen (Naprosyn) 500 mg tablet, Take 1 tablet (500 mg total) by mouth 2 (two) times a day with meals (Patient not taking: Reported on  "12/15/2023), Disp: 20 tablet, Rfl: 0    Riboflavin 400 MG CAPS, Take 1 capsule (400 mg total) by mouth daily (Patient not taking: Reported on 1/26/2023), Disp: 30 capsule, Rfl: 1    rizatriptan (Maxalt) 10 mg tablet, Take 1 tablet (10 mg total) by mouth as needed for migraine Take at the onset of migraine; if symptoms continue or return, may take another dose at least 2 hours after first dose. Take no more than 2 doses in a day. (Patient not taking: Reported on 1/26/2023), Disp: 18 tablet, Rfl: 0    Current Allergies     Allergies as of 09/13/2024    (No Known Allergies)            The following portions of the patient's history were reviewed and updated as appropriate: allergies, current medications, past family history, past medical history, past social history, past surgical history and problem list.     Past Medical History:   Diagnosis Date    Chalazion of right upper eyelid     COVID-19 05/2022       Past Surgical History:   Procedure Laterality Date    HERNIA REPAIR      LASIK         Family History   Adopted: Yes   Family history unknown: Yes         Medications have been verified.        Objective   /87   Pulse 94   Temp 98.3 °F (36.8 °C) (Tympanic)   Resp 18   Ht 5' 6\" (1.676 m)   Wt 76 kg (167 lb 9.6 oz)   SpO2 98%   BMI 27.05 kg/m²   No LMP for male patient.       Physical Exam     Physical Exam  Vitals and nursing note reviewed.   Constitutional:       General: He is not in acute distress.     Appearance: Normal appearance. He is normal weight. He is not ill-appearing, toxic-appearing or diaphoretic.   HENT:      Head: Normocephalic and atraumatic.      Right Ear: Tympanic membrane, ear canal and external ear normal. There is no impacted cerumen.      Left Ear: Tympanic membrane, ear canal and external ear normal. There is no impacted cerumen.      Nose: Congestion and rhinorrhea present.      Mouth/Throat:      Mouth: Mucous membranes are moist.      Pharynx: Oropharynx is clear. No " oropharyngeal exudate or posterior oropharyngeal erythema.   Eyes:      Extraocular Movements: Extraocular movements intact.      Pupils: Pupils are equal, round, and reactive to light.   Neck:      Vascular: No carotid bruit.   Cardiovascular:      Rate and Rhythm: Normal rate and regular rhythm.      Pulses: Normal pulses.      Heart sounds: Normal heart sounds.   Pulmonary:      Effort: Pulmonary effort is normal. No respiratory distress.      Breath sounds: Normal breath sounds. No stridor. No wheezing, rhonchi or rales.   Chest:      Chest wall: No tenderness.   Abdominal:      General: Abdomen is flat.   Musculoskeletal:         General: No swelling, tenderness, deformity or signs of injury.      Cervical back: Normal range of motion and neck supple. No rigidity or tenderness.      Right lower leg: No edema.      Left lower leg: No edema.   Lymphadenopathy:      Cervical: No cervical adenopathy.   Skin:     General: Skin is warm and dry.      Capillary Refill: Capillary refill takes less than 2 seconds.   Neurological:      General: No focal deficit present.      Mental Status: He is alert and oriented to person, place, and time.   Psychiatric:         Behavior: Behavior normal.